# Patient Record
Sex: MALE | Race: WHITE | URBAN - METROPOLITAN AREA
[De-identification: names, ages, dates, MRNs, and addresses within clinical notes are randomized per-mention and may not be internally consistent; named-entity substitution may affect disease eponyms.]

---

## 2017-09-11 ENCOUNTER — TELEPHONE (OUTPATIENT)
Dept: FAMILY MEDICINE CLINIC | Facility: CLINIC | Age: 67
End: 2017-09-11

## 2017-09-11 DIAGNOSIS — N40.1 BENIGN PROSTATIC HYPERPLASIA WITH NOCTURIA: ICD-10-CM

## 2017-09-11 DIAGNOSIS — E11.9 TYPE 2 DIABETES MELLITUS WITHOUT COMPLICATION, WITHOUT LONG-TERM CURRENT USE OF INSULIN (HCC): ICD-10-CM

## 2017-09-11 DIAGNOSIS — R35.1 BENIGN PROSTATIC HYPERPLASIA WITH NOCTURIA: ICD-10-CM

## 2017-09-11 DIAGNOSIS — I10 HTN (HYPERTENSION), BENIGN: Primary | ICD-10-CM

## 2017-09-11 RX ORDER — CYCLOBENZAPRINE HCL 10 MG
1 TABLET ORAL NIGHTLY PRN
COMMUNITY
Start: 2016-07-07 | End: 2017-12-12

## 2017-09-11 RX ORDER — DULOXETIN HYDROCHLORIDE 60 MG/1
2 CAPSULE, DELAYED RELEASE ORAL 2 TIMES DAILY
COMMUNITY
Start: 2015-07-14

## 2017-09-11 RX ORDER — UBIDECARENONE/VITAMIN E MIXED 100 MG-100
1 CAPSULE ORAL DAILY
COMMUNITY
Start: 2015-02-04 | End: 2017-09-18

## 2017-09-11 RX ORDER — GLIMEPIRIDE 2 MG/1
1 TABLET ORAL DAILY
COMMUNITY
Start: 2015-06-19 | End: 2017-09-18

## 2017-09-11 RX ORDER — BRIMONIDINE TARTRATE/TIMOLOL 0.2%-0.5%
1 DROPS OPHTHALMIC (EYE) 2 TIMES DAILY
Refills: 11 | COMMUNITY
Start: 2017-09-06 | End: 2021-05-04

## 2017-09-12 ENCOUNTER — LABORATORY ENCOUNTER (OUTPATIENT)
Dept: LAB | Age: 67
End: 2017-09-12
Attending: FAMILY MEDICINE
Payer: MEDICARE

## 2017-09-12 DIAGNOSIS — N40.1 BENIGN PROSTATIC HYPERPLASIA WITH NOCTURIA: ICD-10-CM

## 2017-09-12 DIAGNOSIS — E11.9 TYPE 2 DIABETES MELLITUS WITHOUT COMPLICATION, WITHOUT LONG-TERM CURRENT USE OF INSULIN (HCC): ICD-10-CM

## 2017-09-12 DIAGNOSIS — R35.1 BENIGN PROSTATIC HYPERPLASIA WITH NOCTURIA: ICD-10-CM

## 2017-09-12 DIAGNOSIS — I10 HTN (HYPERTENSION), BENIGN: ICD-10-CM

## 2017-09-12 LAB
ALBUMIN SERPL-MCNC: 3.7 G/DL (ref 3.5–4.8)
ALP LIVER SERPL-CCNC: 68 U/L (ref 45–117)
ALT SERPL-CCNC: 26 U/L (ref 17–63)
AST SERPL-CCNC: 8 U/L (ref 15–41)
BASOPHILS # BLD AUTO: 0.05 X10(3) UL (ref 0–0.1)
BASOPHILS NFR BLD AUTO: 0.6 %
BILIRUB SERPL-MCNC: 1 MG/DL (ref 0.1–2)
BUN BLD-MCNC: 23 MG/DL (ref 8–20)
CALCIUM BLD-MCNC: 9 MG/DL (ref 8.3–10.3)
CHLORIDE: 111 MMOL/L (ref 101–111)
CHOLEST SMN-MCNC: 202 MG/DL (ref ?–200)
CO2: 24 MMOL/L (ref 22–32)
COMPLEXED PSA SERPL-MCNC: 1.3 NG/ML (ref 0.01–4)
CREAT BLD-MCNC: 1.07 MG/DL (ref 0.7–1.3)
CREAT UR-SCNC: 86.7 MG/DL
EOSINOPHIL # BLD AUTO: 0.32 X10(3) UL (ref 0–0.3)
EOSINOPHIL NFR BLD AUTO: 3.7 %
ERYTHROCYTE [DISTWIDTH] IN BLOOD BY AUTOMATED COUNT: 14 % (ref 11.5–16)
EST. AVERAGE GLUCOSE BLD GHB EST-MCNC: 148 MG/DL (ref 68–126)
GLUCOSE BLD-MCNC: 135 MG/DL (ref 70–99)
HBA1C MFR BLD HPLC: 6.8 % (ref ?–5.7)
HCT VFR BLD AUTO: 43.8 % (ref 37–53)
HDLC SERPL-MCNC: 37 MG/DL (ref 45–?)
HDLC SERPL: 5.46 {RATIO} (ref ?–4.97)
HGB BLD-MCNC: 13.8 G/DL (ref 13–17)
IMMATURE GRANULOCYTE COUNT: 0.03 X10(3) UL (ref 0–1)
IMMATURE GRANULOCYTE RATIO %: 0.3 %
LDLC SERPL CALC-MCNC: 136 MG/DL (ref ?–130)
LDLC SERPL-MCNC: 29 MG/DL (ref 5–40)
LYMPHOCYTES # BLD AUTO: 2.5 X10(3) UL (ref 0.9–4)
LYMPHOCYTES NFR BLD AUTO: 29.1 %
M PROTEIN MFR SERPL ELPH: 7.3 G/DL (ref 6.1–8.3)
MCH RBC QN AUTO: 27.2 PG (ref 27–33.2)
MCHC RBC AUTO-ENTMCNC: 31.5 G/DL (ref 31–37)
MCV RBC AUTO: 86.2 FL (ref 80–99)
MICROALBUMIN UR-MCNC: <0.5 MG/DL
MONOCYTES # BLD AUTO: 0.63 X10(3) UL (ref 0.1–0.6)
MONOCYTES NFR BLD AUTO: 7.3 %
NEUTROPHIL ABS PRELIM: 5.05 X10 (3) UL (ref 1.3–6.7)
NEUTROPHILS # BLD AUTO: 5.05 X10(3) UL (ref 1.3–6.7)
NEUTROPHILS NFR BLD AUTO: 59 %
NONHDLC SERPL-MCNC: 165 MG/DL (ref ?–130)
PLATELET # BLD AUTO: 262 10(3)UL (ref 150–450)
POTASSIUM SERPL-SCNC: 4.4 MMOL/L (ref 3.6–5.1)
RBC # BLD AUTO: 5.08 X10(6)UL (ref 3.8–5.8)
RED CELL DISTRIBUTION WIDTH-SD: 43.7 FL (ref 35.1–46.3)
SODIUM SERPL-SCNC: 143 MMOL/L (ref 136–144)
TRIGLYCERIDES: 143 MG/DL (ref ?–150)
TSI SER-ACNC: 2.76 MIU/ML (ref 0.35–5.5)
URIC ACID: 5.3 MG/DL (ref 2.4–8.7)
WBC # BLD AUTO: 8.6 X10(3) UL (ref 4–13)

## 2017-09-12 PROCEDURE — 80053 COMPREHEN METABOLIC PANEL: CPT

## 2017-09-12 PROCEDURE — 84443 ASSAY THYROID STIM HORMONE: CPT

## 2017-09-12 PROCEDURE — 80061 LIPID PANEL: CPT

## 2017-09-12 PROCEDURE — 82043 UR ALBUMIN QUANTITATIVE: CPT

## 2017-09-12 PROCEDURE — 36415 COLL VENOUS BLD VENIPUNCTURE: CPT

## 2017-09-12 PROCEDURE — 85025 COMPLETE CBC W/AUTO DIFF WBC: CPT

## 2017-09-12 PROCEDURE — 84550 ASSAY OF BLOOD/URIC ACID: CPT

## 2017-09-12 PROCEDURE — 83036 HEMOGLOBIN GLYCOSYLATED A1C: CPT

## 2017-09-12 PROCEDURE — 82570 ASSAY OF URINE CREATININE: CPT

## 2017-09-18 ENCOUNTER — OFFICE VISIT (OUTPATIENT)
Dept: FAMILY MEDICINE CLINIC | Facility: CLINIC | Age: 67
End: 2017-09-18

## 2017-09-18 VITALS
WEIGHT: 224 LBS | HEIGHT: 72 IN | RESPIRATION RATE: 16 BRPM | BODY MASS INDEX: 30.34 KG/M2 | SYSTOLIC BLOOD PRESSURE: 100 MMHG | DIASTOLIC BLOOD PRESSURE: 60 MMHG | TEMPERATURE: 97 F | HEART RATE: 68 BPM

## 2017-09-18 DIAGNOSIS — N02.9 IDIOPATHIC HEMATURIA WITH GLOMERULAR MORPHOLOGIC CHANGES: ICD-10-CM

## 2017-09-18 DIAGNOSIS — E11.9 CONTROLLED TYPE 2 DIABETES MELLITUS WITHOUT COMPLICATION, WITHOUT LONG-TERM CURRENT USE OF INSULIN (HCC): ICD-10-CM

## 2017-09-18 DIAGNOSIS — Z12.11 ENCOUNTER FOR SCREENING COLONOSCOPY: ICD-10-CM

## 2017-09-18 DIAGNOSIS — K85.00 IDIOPATHIC ACUTE PANCREATITIS, UNSPECIFIED COMPLICATION STATUS: ICD-10-CM

## 2017-09-18 DIAGNOSIS — E66.09 CLASS 1 OBESITY DUE TO EXCESS CALORIES WITH SERIOUS COMORBIDITY AND BODY MASS INDEX (BMI) OF 30.0 TO 30.9 IN ADULT: ICD-10-CM

## 2017-09-18 DIAGNOSIS — Z00.00 ENCOUNTER FOR ANNUAL HEALTH EXAMINATION: Primary | ICD-10-CM

## 2017-09-18 PROCEDURE — 99214 OFFICE O/P EST MOD 30 MIN: CPT | Performed by: FAMILY MEDICINE

## 2017-09-18 PROCEDURE — G0439 PPPS, SUBSEQ VISIT: HCPCS | Performed by: FAMILY MEDICINE

## 2017-09-18 RX ORDER — CALCIUM POLYCARBOPHIL 625 MG 625 MG/1
625 TABLET ORAL DAILY
COMMUNITY
End: 2018-08-15

## 2017-09-18 NOTE — PATIENT INSTRUCTIONS
Please schedule colonoscopy. Recommended Websites for Advanced Directives    [email or web address] org/publications/[email or web address]. pdf  An information packet, including necessary form from the [employer] website.      http:/

## 2017-09-18 NOTE — PROGRESS NOTES
[facility] [place]  PROGRESS NOTE  Chief Complaint:   Patient presents with:  Physical      HPI:   This is a 75year old male coming in for his annual Medicare wellness exam.  He has not been seen for 18 months.     He said that he has not been h Triglycerides 143 <150 mg/dL   HDL Cholesterol 37 (L) >45 mg/dL   LDL Cholesterol 136 (H) <130 mg/dL   VLDL 29 5 - 40 mg/dL   Chol/HDL Ratio 5.46 (H) <4.97   Non HDL Chol 165 (H) <130 mg/dL   -MICROALB/CREAT RATIO, RANDOM URINE   Result Value Ref Range pertinent family history. Allergies:    Pravastatin                 Comment:Other reaction(s): Diffuse muscle aches  Current Meds:    Current Outpatient Prescriptions:  Misc Natural Products (SAW PALMETTO COMPLEX EX ST OR) Take 1 capsule by mouth daily.  D splenectomy. PSYCHIATRIC:  Denies depression or anxiety. ENDOCRINOLOGIC:  Denies excessive sweating, cold or heat intolerance, polyuria or polydipsia. He denies any symptoms of high or low blood sugars.   ALLERGIES:  Denies allergic response, history of visualized feet and the appearance is normal.  Bilateral monofilament/sensation of both feet is normal.  Pulsation pedal pulse exam of both lower legs/feet is normal as well. ASSESSMENT AND PLAN:   1.  Encounter for annual health examination  This is h prescriptions requested or ordered in this encounter       Patient/Caregiver Education: Patient/Caregiver Education: There are no barriers to learning. Medical education done. Outcome: Patient verbalizes understanding.  Patient is notified to call with an

## 2017-09-27 ENCOUNTER — TELEPHONE (OUTPATIENT)
Dept: FAMILY MEDICINE CLINIC | Facility: CLINIC | Age: 67
End: 2017-09-27

## 2017-10-03 ENCOUNTER — TELEPHONE (OUTPATIENT)
Dept: FAMILY MEDICINE CLINIC | Facility: CLINIC | Age: 67
End: 2017-10-03

## 2017-10-03 NOTE — TELEPHONE ENCOUNTER
Phone number to Dr Amarjit Sharpe given to [facility]. She will call for Appt.   Hina Raza, 10/03/17, 1:52 PM

## 2017-12-11 ENCOUNTER — TELEPHONE (OUTPATIENT)
Dept: FAMILY MEDICINE CLINIC | Facility: CLINIC | Age: 67
End: 2017-12-11

## 2017-12-11 NOTE — TELEPHONE ENCOUNTER
Patient states it has been 10 years since He has had a new CPap Machine. Requesting Rx for Feebbo to [place]. Appt given with Keaton Rowland 10:45 Tuesday 12/12 for CPap discussion.   Ruth Ann Martinez, 12/11/17, 10:14 AM

## 2017-12-12 ENCOUNTER — OFFICE VISIT (OUTPATIENT)
Dept: FAMILY MEDICINE CLINIC | Facility: CLINIC | Age: 67
End: 2017-12-12

## 2017-12-12 VITALS
DIASTOLIC BLOOD PRESSURE: 62 MMHG | HEART RATE: 60 BPM | WEIGHT: 224 LBS | BODY MASS INDEX: 30.34 KG/M2 | HEIGHT: 72 IN | RESPIRATION RATE: 20 BRPM | TEMPERATURE: 97 F | SYSTOLIC BLOOD PRESSURE: 100 MMHG

## 2017-12-12 DIAGNOSIS — Z99.89 OSA ON CPAP: Primary | ICD-10-CM

## 2017-12-12 DIAGNOSIS — E11.9 TYPE 2 DIABETES MELLITUS WITHOUT COMPLICATION, WITHOUT LONG-TERM CURRENT USE OF INSULIN (HCC): ICD-10-CM

## 2017-12-12 DIAGNOSIS — M62.830 MUSCLE SPASM OF BACK: ICD-10-CM

## 2017-12-12 DIAGNOSIS — I10 HTN (HYPERTENSION), BENIGN: ICD-10-CM

## 2017-12-12 DIAGNOSIS — G47.33 OSA ON CPAP: Primary | ICD-10-CM

## 2017-12-12 PROCEDURE — 99214 OFFICE O/P EST MOD 30 MIN: CPT | Performed by: NURSE PRACTITIONER

## 2017-12-12 RX ORDER — LATANOPROST 50 UG/ML
SOLUTION/ DROPS OPHTHALMIC
Refills: 7 | COMMUNITY
Start: 2017-11-19 | End: 2021-05-04

## 2017-12-12 RX ORDER — TIMOLOL MALEATE 5 MG/ML
SOLUTION/ DROPS OPHTHALMIC
Refills: 7 | COMMUNITY
Start: 2017-11-19 | End: 2021-05-04

## 2017-12-12 RX ORDER — CYCLOBENZAPRINE HCL 10 MG
10 TABLET ORAL NIGHTLY PRN
Qty: 10 TABLET | Refills: 0 | Status: SHIPPED | OUTPATIENT
Start: 2017-12-12 | End: 2017-12-23

## 2017-12-12 NOTE — PROGRESS NOTES
[facility]        HPI:   This is a 75year old male coming in for Patient presents with:  Obstructive Sleep Apnea (RADHA): Sleep f/u/new machine      HPI: Has been using his CPAP since 2010.  States that he uses his mac Solution INT 1 GTT IN OU HS Disp:  Rfl: 7   Timolol Maleate 0.5 % Ophthalmic Solution INT 1 GTT IN OU BID Disp:  Rfl: 7   Cyclobenzaprine HCl 10 MG Oral Tab Take 1 tablet (10 mg total) by mouth nightly as needed.  Disp: 10 tablet Rfl: 0   Misc Natural Produ Wt 224 lb   BMI 30.38 kg/m²  Estimated body mass index is 30.38 kg/m² as calculated from the following:    Height as of this encounter: 72\". Weight as of this encounter: 224 lb.    Neck in inches: Neck Circumferenece: 18.75    Wt Readings from Last 6 appointment in the next week. If not heard from them, please call them at [phone]. If any problems, please call us at [phone].       Warned if still with sleep apnea and not using CPAP has 7 fold increased risk and heart attack, stroke, abnormal

## 2017-12-12 NOTE — PATIENT INSTRUCTIONS
Order for new machine sent to [address] [place] [address] for sleep study will be sent to [facility]. They will call to set up an appointment in the next week. If not heard from them, please call them at [phone].   If any problems, please call us at

## 2017-12-26 RX ORDER — CYCLOBENZAPRINE HCL 10 MG
TABLET ORAL
Qty: 30 TABLET | Refills: 0 | Status: SHIPPED
Start: 2017-12-26 | End: 2018-08-15

## 2017-12-26 NOTE — TELEPHONE ENCOUNTER
Future appt:  None  Last Appointment:  12/12/2017    Cholesterol, Total (mg/dL)   Date Value   09/12/2017 202 (H)   ----------  HDL Cholesterol (mg/dL)   Date Value   09/12/2017 37 (L)   ----------  LDL Cholesterol (mg/dL)   Date Value   09/12/2017 136 (H)

## 2018-01-19 ENCOUNTER — TELEPHONE (OUTPATIENT)
Dept: FAMILY MEDICINE CLINIC | Facility: CLINIC | Age: 68
End: 2018-01-19

## 2018-01-19 DIAGNOSIS — G47.33 OSA (OBSTRUCTIVE SLEEP APNEA): Primary | ICD-10-CM

## 2018-01-19 NOTE — TELEPHONE ENCOUNTER
Please let patient know that his titration study has been read. In order for machine and supplies will be faxed to [facility]. Have him schedule an appointment with Dr. Espinoza Thompson or Gael 2 weeks after getting the machine. Thank you.  Florinda Pedraza, 01/19/18, 1

## 2018-01-22 NOTE — TELEPHONE ENCOUNTER
LM for patient to return call. Per Marimar, patient may already have a machine. May have bought machine outright. If so, will need to make sure patient is on the correct pressure.

## 2018-01-22 NOTE — TELEPHONE ENCOUNTER
Patient informed of the below recommendations. Patient is already on a machine and the pressure is set to 11 which is the appropriate pressure for him. F/u appt scheduled.      Future Appointments  Date Time Provider Rowena Murphy   2/13/2018 11:00 AM

## 2018-02-13 ENCOUNTER — TELEPHONE (OUTPATIENT)
Dept: FAMILY MEDICINE CLINIC | Facility: CLINIC | Age: 68
End: 2018-02-13

## 2018-02-13 NOTE — TELEPHONE ENCOUNTER
I left a message for Abdoul Pierce letting him know that he no showed for his NP sleep appointment today, and to call the office to reschedule. I also noted in the message he will be charged the no show fee.

## 2018-02-15 ENCOUNTER — OFFICE VISIT (OUTPATIENT)
Dept: FAMILY MEDICINE CLINIC | Facility: CLINIC | Age: 68
End: 2018-02-15

## 2018-02-15 VITALS
SYSTOLIC BLOOD PRESSURE: 100 MMHG | RESPIRATION RATE: 20 BRPM | DIASTOLIC BLOOD PRESSURE: 64 MMHG | TEMPERATURE: 97 F | HEART RATE: 72 BPM

## 2018-02-15 DIAGNOSIS — Z99.89 OSA ON CPAP: Primary | ICD-10-CM

## 2018-02-15 DIAGNOSIS — G47.33 OSA ON CPAP: Primary | ICD-10-CM

## 2018-02-15 PROCEDURE — 94660 CPAP INITIATION&MGMT: CPT | Performed by: NURSE PRACTITIONER

## 2018-02-15 NOTE — PATIENT INSTRUCTIONS
Continue using CPAP. Recheck in 6 months - sooner if needed. Warned if still with sleep apnea and not using CPAP has 7 fold increased risk and heart attack, stroke, abnormal heart rhythm, and death. Increased risk of driving accidents.   Advised to r

## 2018-02-15 NOTE — PROGRESS NOTES
[facility]        HPI:   This is a 70year old male coming in for Patient presents with:  Obstructive Sleep Apnea (RADHA): Last visit 12/12/17      HPI: Patient is present to recheck on CPAP.  States that he is doing ok file.  Allergies:    Pravastatin                 Comment:Other reaction(s): Diffuse muscle aches  Current Meds:    Current Outpatient Prescriptions:  CYCLOBENZAPRINE HCL 10 MG Oral Tab TAKE 1 TABLET(10 MG) BY MOUTH EVERY NIGHT AS NEEDED Disp: 30 tablet Rfl EXAM:   /64 (BP Location: Left arm, Patient Position: Sitting, Cuff Size: large)   Pulse 72   Temp (!) 97.1 °F (36.2 °C) (Temporal)   Resp 20  Estimated body mass index is 30.38 kg/m² as calculated from the following:    Height as of 12/12/17: sleep apnea and not using CPAP has a  7 fold increase in risk of heart attack, stroke, abnormal heart rhythm  and death,  increased risk of driving accidents. Advised to refrain from driving when sleepy.     COMPLIANCE is required by insurance for 4 hours

## 2018-05-08 ENCOUNTER — OFFICE VISIT (OUTPATIENT)
Dept: FAMILY MEDICINE CLINIC | Facility: CLINIC | Age: 68
End: 2018-05-08

## 2018-05-08 VITALS
RESPIRATION RATE: 18 BRPM | WEIGHT: 227.13 LBS | SYSTOLIC BLOOD PRESSURE: 106 MMHG | TEMPERATURE: 96 F | BODY MASS INDEX: 30.76 KG/M2 | HEIGHT: 72 IN | OXYGEN SATURATION: 98 % | DIASTOLIC BLOOD PRESSURE: 72 MMHG | HEART RATE: 70 BPM

## 2018-05-08 DIAGNOSIS — J01.00 ACUTE NON-RECURRENT MAXILLARY SINUSITIS: Primary | ICD-10-CM

## 2018-05-08 PROCEDURE — 99213 OFFICE O/P EST LOW 20 MIN: CPT | Performed by: FAMILY MEDICINE

## 2018-05-08 RX ORDER — BENZONATATE 100 MG/1
100 CAPSULE ORAL 3 TIMES DAILY PRN
Qty: 30 CAPSULE | Refills: 1 | Status: SHIPPED | OUTPATIENT
Start: 2018-05-08 | End: 2018-08-15

## 2018-05-08 RX ORDER — AZITHROMYCIN 250 MG/1
TABLET, FILM COATED ORAL
Qty: 6 TABLET | Refills: 0 | Status: SHIPPED | OUTPATIENT
Start: 2018-05-08 | End: 2018-08-15

## 2018-05-08 NOTE — PROGRESS NOTES
[facility] [place]  PROGRESS NOTE  Chief Complaint:   Patient presents with:  Cough      HPI:   This is a 70year old male coming in for cough, sinus congestion, stuffiness, and just not feeling well.   He reports that his wife was recently diagn 150.0 - 450.0 10(3)uL   MCV 86.2 80.0 - 99.0 fL   MCH 27.2 27.0 - 33.2 pg   MCHC 31.5 31.0 - 37.0 g/dL   RDW 14.0 11.5 - 16.0 %   RDW-SD 43.7 35.1 - 46.3 fL   Neutrophil Absolute Prelim 5.05 1.30 - 6.70 x10 (3) uL   Neutrophil Absolute 5.05 1.30 - 6.70 x10 Polycarbophil (FIBERCON) 625 MG Oral Tab Take 625 mg by mouth daily. Disp:  Rfl:    topiramate (TOPAMAX) 200 MG Oral Tab Take 1 tablet by mouth daily. Disp:  Rfl:    COMBIGAN 0.2-0.5 % Ophthalmic Solution 1 drop by Each eye route 2 (two) times daily.  Disp: 72\".    Weight as of this encounter: 227 lb 2 oz. Vital signs reviewed. Appears stated age, well groomed. Physical Exam:  GEN: He is alert and smiling. He does not appear ill. He is coughing frequently. He is very congested.   HEENT:  Head:  Normoceph shoulder     Backache     Low back pain     Allergic contact dermatitis due to other agents     Controlled type 2 diabetes mellitus without complication, without long-term current use of insulin (HCC)     Disorder of lipid metabolism     Psychosexual dysfu

## 2018-08-15 ENCOUNTER — OFFICE VISIT (OUTPATIENT)
Dept: FAMILY MEDICINE CLINIC | Facility: CLINIC | Age: 68
End: 2018-08-15
Payer: MEDICARE

## 2018-08-15 VITALS
RESPIRATION RATE: 16 BRPM | BODY MASS INDEX: 31 KG/M2 | TEMPERATURE: 97 F | WEIGHT: 228 LBS | DIASTOLIC BLOOD PRESSURE: 80 MMHG | HEART RATE: 68 BPM | SYSTOLIC BLOOD PRESSURE: 110 MMHG

## 2018-08-15 DIAGNOSIS — G47.33 OSA ON CPAP: Primary | ICD-10-CM

## 2018-08-15 DIAGNOSIS — Z99.89 OSA ON CPAP: Primary | ICD-10-CM

## 2018-08-15 PROCEDURE — 99213 OFFICE O/P EST LOW 20 MIN: CPT | Performed by: NURSE PRACTITIONER

## 2018-08-15 NOTE — PATIENT INSTRUCTIONS
Continue using CPAP. Try new mask. Recheck in 6 months - sooner if needed. Schedule physical with Dr. Thomas Jauregui after 9/18/18.      Warned if still with sleep apnea and not using CPAP has 7 fold increased risk and heart attack, stroke, abnormal heart r

## 2018-08-15 NOTE — PROGRESS NOTES
[facility]        HPI:   This is a 70year old male coming in for Patient presents with:  Obstructive Sleep Apnea (RADHA): Sleep compliance f/u       HPI:     Present for recheck on CPAP compliance.  Doing well with [facility] Alcohol use: No              Family History:  No family history on file.   Allergies:    Pravastatin                 Comment:Other reaction(s): Diffuse muscle aches  Current Meds:    Current Outpatient Prescriptions:  latanoprost 0.005 % Ophthalmic Soluti (35.9 °C) (Tympanic)   Resp 16   Wt 228 lb   BMI 30.92 kg/m²  Estimated body mass index is 30.92 kg/m² as calculated from the following:    Height as of 5/8/18: 72\". Weight as of this encounter: 228 lb. Neck in inches:       Wt Readings from Last 6 En still with sleep apnea and not using CPAP has a  7 fold increase in risk of heart attack, stroke, abnormal heart rhythm  and death,  increased risk of driving accidents. Advised to refrain from driving when sleepy.     COMPLIANCE is required by insurance

## 2018-09-19 ENCOUNTER — TELEPHONE (OUTPATIENT)
Dept: FAMILY MEDICINE CLINIC | Facility: CLINIC | Age: 68
End: 2018-09-19

## 2018-09-19 DIAGNOSIS — E78.9 DISORDER OF LIPID METABOLISM: ICD-10-CM

## 2018-09-19 DIAGNOSIS — E11.9 CONTROLLED TYPE 2 DIABETES MELLITUS WITHOUT COMPLICATION, WITHOUT LONG-TERM CURRENT USE OF INSULIN (HCC): ICD-10-CM

## 2018-09-19 DIAGNOSIS — I10 HTN (HYPERTENSION), BENIGN: Primary | ICD-10-CM

## 2018-09-19 DIAGNOSIS — Z12.5 ENCOUNTER FOR SCREENING FOR MALIGNANT NEOPLASM OF PROSTATE: ICD-10-CM

## 2018-09-19 NOTE — TELEPHONE ENCOUNTER
Patient requesting Fasting Lab Orders to include PSA Level. Please advise.   [facility], 09/19/18, 11:03 AM

## 2018-09-19 NOTE — TELEPHONE ENCOUNTER
Patient has appointment for medicare annual on 9/25, wants to know if he can have some blood work done before his px appointment so he can discuss results with , needs orders.

## 2018-09-21 ENCOUNTER — LABORATORY ENCOUNTER (OUTPATIENT)
Dept: LAB | Age: 68
End: 2018-09-21
Attending: FAMILY MEDICINE
Payer: MEDICARE

## 2018-09-21 DIAGNOSIS — Z12.5 ENCOUNTER FOR SCREENING FOR MALIGNANT NEOPLASM OF PROSTATE: ICD-10-CM

## 2018-09-21 DIAGNOSIS — E11.9 CONTROLLED TYPE 2 DIABETES MELLITUS WITHOUT COMPLICATION, WITHOUT LONG-TERM CURRENT USE OF INSULIN (HCC): ICD-10-CM

## 2018-09-21 DIAGNOSIS — E78.9 DISORDER OF LIPID METABOLISM: ICD-10-CM

## 2018-09-21 DIAGNOSIS — I10 HTN (HYPERTENSION), BENIGN: ICD-10-CM

## 2018-09-21 LAB
ALBUMIN SERPL-MCNC: 3.8 G/DL (ref 3.5–4.8)
ALBUMIN/GLOB SERPL: 1.1 {RATIO} (ref 1–2)
ALP LIVER SERPL-CCNC: 71 U/L (ref 45–117)
ALT SERPL-CCNC: 31 U/L (ref 17–63)
ANION GAP SERPL CALC-SCNC: 7 MMOL/L (ref 0–18)
AST SERPL-CCNC: 13 U/L (ref 15–41)
BASOPHILS # BLD AUTO: 0.04 X10(3) UL (ref 0–0.1)
BASOPHILS NFR BLD AUTO: 0.5 %
BILIRUB SERPL-MCNC: 0.4 MG/DL (ref 0.1–2)
BUN BLD-MCNC: 29 MG/DL (ref 8–20)
BUN/CREAT SERPL: 26.1 (ref 10–20)
CALCIUM BLD-MCNC: 9.1 MG/DL (ref 8.3–10.3)
CHLORIDE SERPL-SCNC: 112 MMOL/L (ref 101–111)
CHOLEST SMN-MCNC: 200 MG/DL (ref ?–200)
CO2 SERPL-SCNC: 22 MMOL/L (ref 22–32)
COMPLEXED PSA SERPL-MCNC: 1.45 NG/ML (ref 0.01–4)
CREAT BLD-MCNC: 1.11 MG/DL (ref 0.7–1.3)
CREAT UR-SCNC: 79.6 MG/DL
EOSINOPHIL # BLD AUTO: 0.31 X10(3) UL (ref 0–0.3)
EOSINOPHIL NFR BLD AUTO: 3.8 %
ERYTHROCYTE [DISTWIDTH] IN BLOOD BY AUTOMATED COUNT: 14 % (ref 11.5–16)
EST. AVERAGE GLUCOSE BLD GHB EST-MCNC: 146 MG/DL (ref 68–126)
GLOBULIN PLAS-MCNC: 3.5 G/DL (ref 2.5–4)
GLUCOSE BLD-MCNC: 139 MG/DL (ref 70–99)
HBA1C MFR BLD HPLC: 6.7 % (ref ?–5.7)
HCT VFR BLD AUTO: 44.7 % (ref 37–53)
HDLC SERPL-MCNC: 29 MG/DL (ref 40–59)
HGB BLD-MCNC: 13.8 G/DL (ref 13–17)
IMMATURE GRANULOCYTE COUNT: 0.02 X10(3) UL (ref 0–1)
IMMATURE GRANULOCYTE RATIO %: 0.2 %
LDLC SERPL CALC-MCNC: 140 MG/DL (ref ?–100)
LYMPHOCYTES # BLD AUTO: 2.4 X10(3) UL (ref 0.9–4)
LYMPHOCYTES NFR BLD AUTO: 29.7 %
M PROTEIN MFR SERPL ELPH: 7.3 G/DL (ref 6.1–8.3)
MCH RBC QN AUTO: 27.2 PG (ref 27–33.2)
MCHC RBC AUTO-ENTMCNC: 30.9 G/DL (ref 31–37)
MCV RBC AUTO: 88.2 FL (ref 80–99)
MICROALBUMIN UR-MCNC: <0.5 MG/DL
MONOCYTES # BLD AUTO: 0.58 X10(3) UL (ref 0.1–1)
MONOCYTES NFR BLD AUTO: 7.2 %
NEUTROPHIL ABS PRELIM: 4.72 X10 (3) UL (ref 1.3–6.7)
NEUTROPHILS # BLD AUTO: 4.72 X10(3) UL (ref 1.3–6.7)
NEUTROPHILS NFR BLD AUTO: 58.6 %
NONHDLC SERPL-MCNC: 171 MG/DL (ref ?–130)
OSMOLALITY SERPL CALC.SUM OF ELEC: 300 MOSM/KG (ref 275–295)
PLATELET # BLD AUTO: 267 10(3)UL (ref 150–450)
POTASSIUM SERPL-SCNC: 4.5 MMOL/L (ref 3.6–5.1)
RBC # BLD AUTO: 5.07 X10(6)UL (ref 3.8–5.8)
RED CELL DISTRIBUTION WIDTH-SD: 45.2 FL (ref 35.1–46.3)
SODIUM SERPL-SCNC: 141 MMOL/L (ref 136–144)
TRIGL SERPL-MCNC: 156 MG/DL (ref 30–149)
TSI SER-ACNC: 3.07 MIU/ML (ref 0.35–5.5)
URATE SERPL-MCNC: 5.8 MG/DL (ref 2.4–8.7)
VLDLC SERPL CALC-MCNC: 31 MG/DL (ref 0–30)
WBC # BLD AUTO: 8.1 X10(3) UL (ref 4–13)

## 2018-09-21 PROCEDURE — 85025 COMPLETE CBC W/AUTO DIFF WBC: CPT

## 2018-09-21 PROCEDURE — 82570 ASSAY OF URINE CREATININE: CPT

## 2018-09-21 PROCEDURE — 82043 UR ALBUMIN QUANTITATIVE: CPT

## 2018-09-21 PROCEDURE — 80061 LIPID PANEL: CPT

## 2018-09-21 PROCEDURE — 84443 ASSAY THYROID STIM HORMONE: CPT

## 2018-09-21 PROCEDURE — 36415 COLL VENOUS BLD VENIPUNCTURE: CPT

## 2018-09-21 PROCEDURE — 83036 HEMOGLOBIN GLYCOSYLATED A1C: CPT

## 2018-09-21 PROCEDURE — 84550 ASSAY OF BLOOD/URIC ACID: CPT

## 2018-09-21 PROCEDURE — 80053 COMPREHEN METABOLIC PANEL: CPT

## 2018-09-25 ENCOUNTER — OFFICE VISIT (OUTPATIENT)
Dept: FAMILY MEDICINE CLINIC | Facility: CLINIC | Age: 68
End: 2018-09-25
Payer: MEDICARE

## 2018-09-25 VITALS
WEIGHT: 218.38 LBS | RESPIRATION RATE: 16 BRPM | TEMPERATURE: 97 F | SYSTOLIC BLOOD PRESSURE: 104 MMHG | HEART RATE: 80 BPM | BODY MASS INDEX: 29.58 KG/M2 | DIASTOLIC BLOOD PRESSURE: 70 MMHG | HEIGHT: 72 IN

## 2018-09-25 DIAGNOSIS — E11.9 CONTROLLED TYPE 2 DIABETES MELLITUS WITHOUT COMPLICATION, WITHOUT LONG-TERM CURRENT USE OF INSULIN (HCC): ICD-10-CM

## 2018-09-25 DIAGNOSIS — Z00.00 ENCOUNTER FOR ANNUAL HEALTH EXAMINATION: ICD-10-CM

## 2018-09-25 DIAGNOSIS — M54.50 CHRONIC MIDLINE LOW BACK PAIN WITHOUT SCIATICA: Primary | ICD-10-CM

## 2018-09-25 DIAGNOSIS — M79.2 NEURALGIA: ICD-10-CM

## 2018-09-25 DIAGNOSIS — I10 HTN (HYPERTENSION), BENIGN: ICD-10-CM

## 2018-09-25 DIAGNOSIS — G89.29 CHRONIC MIDLINE LOW BACK PAIN WITHOUT SCIATICA: Primary | ICD-10-CM

## 2018-09-25 DIAGNOSIS — E78.9 DISORDER OF LIPID METABOLISM: ICD-10-CM

## 2018-09-25 PROCEDURE — G0439 PPPS, SUBSEQ VISIT: HCPCS | Performed by: FAMILY MEDICINE

## 2018-09-25 PROCEDURE — 99214 OFFICE O/P EST MOD 30 MIN: CPT | Performed by: FAMILY MEDICINE

## 2018-09-25 NOTE — PATIENT INSTRUCTIONS
PT referral for back pain. Recommended Websites for Advanced Directives    [email or web address] org/publications/Documents/personal_[email or web address]. pdf  An information packet, including necessary form from the [employer] website. http://www. idph.

## 2018-09-25 NOTE — PROGRESS NOTES
[facility] [place]  PROGRESS NOTE  Chief Complaint:   Patient presents with:  Physical: Interested in Cologuard      HPI:   This is a 70year old male coming in for his annual Medicare wellness exam.    He has chronic low back pain.   Sometimes h 3.070 0.350 - 5.500 mIU/mL   URIC ACID, SERUM   Result Value Ref Range    Uric Acid 5.8 2.4 - 8.7 mg/dL   PSA SCREEN   Result Value Ref Range    Prostate Specific Antigen Screen 1.45 0.01 - 4.00 ng/mL   HEMOGLOBIN A1C   Result Value Ref Range    HgbA1C 6.7 Outpatient Medications:  latanoprost 0.005 % Ophthalmic Solution INT 1 GTT IN OU HS Disp:  Rfl: 7   Timolol Maleate 0.5 % Ophthalmic Solution INT 1 GTT IN OU BID Disp:  Rfl: 7   Misc Natural Products (SAW PALMETTO COMPLEX EX ST OR) Take 1 capsule by mouth (Tympanic)   Resp 16   Ht 72\"   Wt 218 lb 6.4 oz   BMI 29.62 kg/m²  Estimated body mass index is 29.62 kg/m² as calculated from the following:    Height as of this encounter: 72\". Weight as of this encounter: 218 lb 6.4 oz. Vital signs reviewed.   [facility] spine center.  - PHYSICAL THERAPY EXTERNAL  - OFFICE/OUTPT VISIT,EST,LEVL IV    2. HTN (hypertension), benign  His blood pressure is well controlled now. - OFFICE/OUTPT VISIT,EST,LEVL IV    3.  Controlled type 2 diabetes mellitus without complication, with dysfunction with inhibited sexual excitement     Routine general medical examination at a health care facility     Hydronephrosis     Calculus of kidney     Obesity     Pyelonephritis     Right bundle branch block     RADHA on CPAP     Calculus of ureter

## 2018-09-28 ENCOUNTER — MED REC SCAN ONLY (OUTPATIENT)
Dept: FAMILY MEDICINE CLINIC | Facility: CLINIC | Age: 68
End: 2018-09-28

## 2018-11-26 ENCOUNTER — TELEPHONE (OUTPATIENT)
Dept: FAMILY MEDICINE CLINIC | Facility: CLINIC | Age: 68
End: 2018-11-26

## 2018-11-26 NOTE — TELEPHONE ENCOUNTER
Please call Demian Gravely. This is great news. His Cologard testing is negative. No additional testing needed now.

## 2019-01-07 ENCOUNTER — TELEPHONE (OUTPATIENT)
Dept: FAMILY MEDICINE CLINIC | Facility: CLINIC | Age: 69
End: 2019-01-07

## 2019-01-07 NOTE — TELEPHONE ENCOUNTER
Information requested emailed to myself and emailed to patient at [email or web address] [employer]. Sent secure by myself/sherman Henriquez, 01/07/19, 2:00 PM

## 2019-01-07 NOTE — TELEPHONE ENCOUNTER
Needs copy of last blood work and needs cologuard results fax today (right now if possible) to [phone].

## 2019-04-27 ENCOUNTER — TELEPHONE (OUTPATIENT)
Dept: FAMILY MEDICINE CLINIC | Facility: CLINIC | Age: 69
End: 2019-04-27

## 2019-05-11 ENCOUNTER — TELEPHONE (OUTPATIENT)
Dept: FAMILY MEDICINE CLINIC | Facility: CLINIC | Age: 69
End: 2019-05-11

## 2019-05-11 NOTE — TELEPHONE ENCOUNTER
Received CME for supplies. Pt needs follow up appointment for compliance, last OV 8/15/18. Spoke with patient and he scheduled appointment and was instructed to bring in the card from his machine. Pt verbalized understanding.   Future Appointments   Date Ti

## 2019-05-16 ENCOUNTER — OFFICE VISIT (OUTPATIENT)
Dept: FAMILY MEDICINE CLINIC | Facility: CLINIC | Age: 69
End: 2019-05-16
Payer: MEDICARE

## 2019-05-16 VITALS
RESPIRATION RATE: 18 BRPM | DIASTOLIC BLOOD PRESSURE: 68 MMHG | BODY MASS INDEX: 30.53 KG/M2 | TEMPERATURE: 97 F | HEIGHT: 72 IN | HEART RATE: 76 BPM | WEIGHT: 225.38 LBS | SYSTOLIC BLOOD PRESSURE: 110 MMHG

## 2019-05-16 DIAGNOSIS — Z99.89 OSA ON CPAP: Primary | ICD-10-CM

## 2019-05-16 DIAGNOSIS — G47.33 OSA ON CPAP: Primary | ICD-10-CM

## 2019-05-16 PROCEDURE — 99213 OFFICE O/P EST LOW 20 MIN: CPT | Performed by: NURSE PRACTITIONER

## 2019-05-16 NOTE — PROGRESS NOTES
[facility]        HPI:   This is a 78year old male coming in for Patient presents with:  Obstructive Sleep Apnea (RADHA): f/u      HPI:     Patient is present to review his sleep therapy. Needs CMN signed for Ana María Comment:Other reaction(s): Diffuse muscle aches  Current Meds:    Current Outpatient Medications:  latanoprost 0.005 % Ophthalmic Solution INT 1 GTT IN OU HS Disp:  Rfl: 7   Timolol Maleate 0.5 % Ophthalmic Solution INT 1 GTT IN OU BID Disp:  Rfl: 7 30.57 kg/m²  Estimated body mass index is 30.57 kg/m² as calculated from the following:    Height as of this encounter: 72\". Weight as of this encounter: 225 lb 6.4 oz. Neck in inches:       Wt Readings from Last 6 Encounters:  05/16/19 : 225 lb 6.4 o nights of the week. Recommend weight loss, and maintain and optimal BMI with Exercise 30 minutes most days of the week to target heart rate . Advised patient to change filters,masks,hoses  and tubes and equiptment on a  regular schedule.   Filters and

## 2019-05-16 NOTE — PATIENT INSTRUCTIONS
Get new mask for leaks. Continue sleep therapy. Follow-up in 6 months - sooner if needed.      Advised if still with sleep apnea and not using CPAP has a  7 fold increase in risk of heart attack, stroke, abnormal heart rhythm  and death,  increased risk

## 2019-11-22 ENCOUNTER — PATIENT OUTREACH (OUTPATIENT)
Dept: CASE MANAGEMENT | Age: 69
End: 2019-11-22

## 2019-12-02 ENCOUNTER — TELEPHONE (OUTPATIENT)
Dept: FAMILY MEDICINE CLINIC | Facility: CLINIC | Age: 69
End: 2019-12-02

## 2019-12-02 NOTE — TELEPHONE ENCOUNTER
Patient states he has cloudy urine. Requesting evaluation today or tomorrow. Appt given with Bill tomorrow 1pm  For evaluation for UTI. Luciano Flaherty, 12/02/19, 2:27 PM    Future appt:     Your appointments     Date & Time Appointment Department [phone]

## 2019-12-03 ENCOUNTER — OFFICE VISIT (OUTPATIENT)
Dept: FAMILY MEDICINE CLINIC | Facility: CLINIC | Age: 69
End: 2019-12-03
Payer: MEDICARE

## 2019-12-03 VITALS
SYSTOLIC BLOOD PRESSURE: 118 MMHG | OXYGEN SATURATION: 97 % | WEIGHT: 230 LBS | DIASTOLIC BLOOD PRESSURE: 80 MMHG | BODY MASS INDEX: 31.15 KG/M2 | TEMPERATURE: 98 F | HEIGHT: 72 IN | HEART RATE: 62 BPM

## 2019-12-03 DIAGNOSIS — R39.15 URGENCY OF URINATION: ICD-10-CM

## 2019-12-03 DIAGNOSIS — N30.90 CYSTITIS: Primary | ICD-10-CM

## 2019-12-03 PROCEDURE — 87077 CULTURE AEROBIC IDENTIFY: CPT | Performed by: NURSE PRACTITIONER

## 2019-12-03 PROCEDURE — 87086 URINE CULTURE/COLONY COUNT: CPT | Performed by: NURSE PRACTITIONER

## 2019-12-03 PROCEDURE — 81001 URINALYSIS AUTO W/SCOPE: CPT | Performed by: NURSE PRACTITIONER

## 2019-12-03 PROCEDURE — 81003 URINALYSIS AUTO W/O SCOPE: CPT | Performed by: NURSE PRACTITIONER

## 2019-12-03 PROCEDURE — 99214 OFFICE O/P EST MOD 30 MIN: CPT | Performed by: NURSE PRACTITIONER

## 2019-12-03 PROCEDURE — 87186 SC STD MICRODIL/AGAR DIL: CPT | Performed by: NURSE PRACTITIONER

## 2019-12-03 RX ORDER — SULFAMETHOXAZOLE AND TRIMETHOPRIM 800; 160 MG/1; MG/1
1 TABLET ORAL 2 TIMES DAILY
Qty: 20 TABLET | Refills: 0 | Status: SHIPPED | OUTPATIENT
Start: 2019-12-03 | End: 2019-12-13

## 2019-12-03 NOTE — PATIENT INSTRUCTIONS
Urine specimen taken today, urine analysis and reflex order placed today as well. Patient will be informed of results. Patient is to begin antibiotic, advised to continue pushing plenty of fluids.     Continue to monitor symptoms such as flank pain, fev professional if they continue or are bothersome):  · diarrhea  · dizziness  · headache  · loss of appetite  · nausea, vomiting  · nervousness  What may interact with this medicine?   Do not take this medicine with any of the following medications:  · aminob products. Contact your doctor if you have diarrhea that lasts more than 2 days or if it is severe and watery. This medicine can make you more sensitive to the sun. Keep out of the sun.  If you cannot avoid being in the sun, wear protective clothing and use

## 2019-12-03 NOTE — PROGRESS NOTES
HPI:    Patient ID: Charline Ricketts is a 78year old male. Patient presents to the clinic today with complaints of cloudy urine, for about a week. Reports that he is had not been drinking enough water, was having some frequency.  Started drinking more water, Ceruminous right ear   Eyes: Pupils are equal, round, and reactive to light. Neck: Normal range of motion. Cardiovascular: Normal rate, regular rhythm and normal heart sounds.    Pulmonary/Chest: Effort normal and breath sounds normal. No respiratory

## 2019-12-05 ENCOUNTER — TELEPHONE (OUTPATIENT)
Dept: FAMILY MEDICINE CLINIC | Facility: CLINIC | Age: 69
End: 2019-12-05

## 2019-12-07 ENCOUNTER — TELEPHONE (OUTPATIENT)
Dept: FAMILY MEDICINE CLINIC | Facility: CLINIC | Age: 69
End: 2019-12-07

## 2019-12-07 NOTE — TELEPHONE ENCOUNTER
Patient notified of lab result and Ambar's instructions. Patient states he was given 10 day course of antibiotic and has not finished it yet. Will call if not improved by end of treatment.

## 2019-12-07 NOTE — TELEPHONE ENCOUNTER
----- Message from YAHIR Betancourt sent at 12/7/2019 10:25 AM CST -----  Results reviewed. Culture consistent with infection. Has he taken the entire course of antibiotic? Is he still symptomatic? If so we can extend antibiotic treatment.  If not n

## 2019-12-23 ENCOUNTER — PATIENT OUTREACH (OUTPATIENT)
Dept: CASE MANAGEMENT | Age: 69
End: 2019-12-23

## 2020-01-22 ENCOUNTER — PATIENT OUTREACH (OUTPATIENT)
Dept: CASE MANAGEMENT | Age: 70
End: 2020-01-22

## 2020-01-24 ENCOUNTER — PATIENT OUTREACH (OUTPATIENT)
Dept: FAMILY MEDICINE CLINIC | Facility: CLINIC | Age: 70
End: 2020-01-24

## 2020-03-06 ENCOUNTER — TELEPHONE (OUTPATIENT)
Dept: FAMILY MEDICINE CLINIC | Facility: CLINIC | Age: 70
End: 2020-03-06

## 2020-03-18 ENCOUNTER — TELEPHONE (OUTPATIENT)
Dept: FAMILY MEDICINE CLINIC | Facility: CLINIC | Age: 70
End: 2020-03-18

## 2020-03-18 DIAGNOSIS — B34.9 VIRAL ILLNESS: Primary | ICD-10-CM

## 2020-03-18 NOTE — TELEPHONE ENCOUNTER
I agree with testing. The testing protocol is going to be set up for our clinic in [place]. It is not yet available. It should be ready later on today or tomorrow morning.   I will put an order in the chart for the test.  We will get back to you and

## 2020-03-18 NOTE — TELEPHONE ENCOUNTER
Mimi informed of below. Expressed understanding. States at this time patient will refuse to go out of town for testing. May go to [place] with an order when testing available.   Ana Covington, 03/18/20, 1:10 PM

## 2020-03-18 NOTE — TELEPHONE ENCOUNTER
Javier Le-  Patient came home Monday at noon with a headache/fatigue. Slept all afternoon/night. Yesterday morning went to work. Came home 3pm with:  Chills/Headache/Exhaustion.     No: LMAOE Aches/Fever/Sore Throat/Cough    [employer] Virus Test

## 2020-03-19 ENCOUNTER — TELEPHONE (OUTPATIENT)
Dept: FAMILY MEDICINE CLINIC | Facility: CLINIC | Age: 70
End: 2020-03-19

## 2020-03-19 NOTE — TELEPHONE ENCOUNTER
FYI: Pt was called to schedule CoVid 19 testing but he seemed to be confused as to why he needed it- was not aware that there was a reason for this and had not spoken to Dr about requesting an order so no testing appt was made

## 2020-03-19 NOTE — TELEPHONE ENCOUNTER
Spoke with patient's wife Mimi. States she has not told patient about having the testing done so this is why he informed Norah Herrera that he knew nothing about it.   Wife states she has already spoken with [facility] about testing and is awaiting her c/b once

## 2020-03-20 NOTE — TELEPHONE ENCOUNTER
No.  We will cancel the test order. Please continue to stay at home until the symptoms have completely resolved.

## 2020-03-20 NOTE — TELEPHONE ENCOUNTER
Mimi states patient is feeling better. Still very fatigued. No: Cough/Sore Throat/Headache/Fever/  Body Aches/Chills. Questions if they should still procede with  Corona Virus Testing once available?   Katina Flores, 03/20/20, 10:27 AM

## 2020-04-28 ENCOUNTER — VIRTUAL PHONE E/M (OUTPATIENT)
Dept: FAMILY MEDICINE CLINIC | Facility: CLINIC | Age: 70
End: 2020-04-28
Payer: MEDICARE

## 2020-04-28 VITALS — WEIGHT: 230 LBS | BODY MASS INDEX: 31 KG/M2

## 2020-04-28 DIAGNOSIS — Z99.89 OSA ON CPAP: Primary | ICD-10-CM

## 2020-04-28 DIAGNOSIS — G47.33 OSA ON CPAP: Primary | ICD-10-CM

## 2020-04-28 PROCEDURE — 99442 PHONE E/M BY PHYS 11-20 MIN: CPT | Performed by: NURSE PRACTITIONER

## 2020-04-28 NOTE — PROGRESS NOTES
[facility]  SLEEP PROGRESS NOTE      Fede Falcon  verbally consents to a Virtual/Telephone Check-In service on 4/28/2020.     Patient understands and accepts financial responsibility for any deductible, co-insurance and/or co-pays as Social History:  Social History    Patient does not qualify to have social determinant information on file (likely too young).     Social History Narrative      Not on file    Social History    Tobacco Use      Smoking status: Never Smoker      [place] HENT: Negative. Eyes: Negative. Respiratory: Negative. Cardiovascular: Negative. Musculoskeletal: Negative. Skin: Negative. Neurological: Negative. Psychiatric/Behavioral: Negative.         EXAM:   Wt 230 lb (104.3 kg)   BMI 31.19 kg/ provider. Advised if still with sleep apnea and not using CPAP has a  7 fold increase in risk of heart attack, stroke, abnormal heart rhythm  and death,  increased risk of driving accidents. Advised to refrain from driving when sleepy.     Leonela Haile

## 2020-11-24 ENCOUNTER — TELEPHONE (OUTPATIENT)
Dept: FAMILY MEDICINE CLINIC | Facility: CLINIC | Age: 70
End: 2020-11-24

## 2020-11-24 NOTE — TELEPHONE ENCOUNTER
Patient tested for Frann Fruits on Friday at [facility]. Results not yet back. Patient with increasing Covid19 Sx. Today still in bed/fatigue/cough/shortness of breath. O2% 90. Advised [facility] ER/agrees.     Mellissa Montoya, 11/24/20, 8:42 AM

## 2020-12-30 ENCOUNTER — TELEPHONE (OUTPATIENT)
Dept: FAMILY MEDICINE CLINIC | Facility: CLINIC | Age: 70
End: 2020-12-30

## 2020-12-30 NOTE — TELEPHONE ENCOUNTER
Dilshad Falcon  verbally consents to a Virtual/Telephone Check-In service on 12/312020. Patient understands and accepts financial responsibility for any deductible, co-insurance and/or co-pays associated with this service.       consent given for PV to

## 2020-12-31 ENCOUNTER — VIRTUAL PHONE E/M (OUTPATIENT)
Dept: FAMILY MEDICINE CLINIC | Facility: CLINIC | Age: 70
End: 2020-12-31
Payer: MEDICARE

## 2020-12-31 DIAGNOSIS — Z86.16 HISTORY OF 2019 NOVEL CORONAVIRUS DISEASE (COVID-19): ICD-10-CM

## 2020-12-31 DIAGNOSIS — G47.33 OSA ON CPAP: Primary | ICD-10-CM

## 2020-12-31 DIAGNOSIS — Z99.89 OSA ON CPAP: Primary | ICD-10-CM

## 2020-12-31 PROCEDURE — 99441 PHONE E/M BY PHYS 5-10 MIN: CPT | Performed by: NURSE PRACTITIONER

## 2020-12-31 NOTE — PROGRESS NOTES
[facility]        HPI:   This is a 69year old male coming in for Patient presents with: Follow - Up: Sleep follow up      HPI:     Phone visit done with patient to review his sleep therapy.   Reviewed with patient t Drug use: No    Family History:  History reviewed. No pertinent family history.   Allergies:    Pravastatin                 Comment:Other reaction(s): Diffuse muscle aches  Current Meds:  Current Outpatient Medications   Medication Sig Dispense Refill   • l index is 31.19 kg/m² as calculated from the following:    Height as of 12/3/19: 6' (1.829 m). Weight as of 4/28/20: 230 lb (104.3 kg). Neck in inches:       Wt Readings from Last 6 Encounters:  04/28/20 : 230 lb (104.3 kg)  12/03/19 : 230 lb (104.3 kg) Advised to refrain from driving when sleepy. COMPLIANCE is required by insurance for 4 hours a night most nights of the week. Recommend weight loss, and maintain and optimal BMI with Exercise 30 minutes most days of the week to target heart rate .

## 2021-02-01 ENCOUNTER — TELEPHONE (OUTPATIENT)
Dept: FAMILY MEDICINE CLINIC | Facility: CLINIC | Age: 71
End: 2021-02-01

## 2021-02-01 NOTE — TELEPHONE ENCOUNTER
I am able to view his ECG that was done at [facility] on November 27, 2020. As long as he does not have any heart related symptoms he does not require an additional ECG now.

## 2021-02-01 NOTE — TELEPHONE ENCOUNTER
Patient coming in for PreOp for Cataract tomorrow. Are you able to pull up the EKG from 11/20  Hospitalization? Please advise.   Marysol Henriquez, 02/01/21, 1:44 PM

## 2021-02-02 ENCOUNTER — OFFICE VISIT (OUTPATIENT)
Dept: FAMILY MEDICINE CLINIC | Facility: CLINIC | Age: 71
End: 2021-02-02
Payer: MEDICARE

## 2021-02-02 VITALS
OXYGEN SATURATION: 98 % | TEMPERATURE: 97 F | WEIGHT: 225.81 LBS | HEART RATE: 81 BPM | HEIGHT: 71 IN | RESPIRATION RATE: 16 BRPM | BODY MASS INDEX: 31.61 KG/M2 | DIASTOLIC BLOOD PRESSURE: 68 MMHG | SYSTOLIC BLOOD PRESSURE: 100 MMHG

## 2021-02-02 DIAGNOSIS — G89.29 CHRONIC MIDLINE LOW BACK PAIN WITHOUT SCIATICA: ICD-10-CM

## 2021-02-02 DIAGNOSIS — G47.33 OSA ON CPAP: ICD-10-CM

## 2021-02-02 DIAGNOSIS — Z86.16 HISTORY OF COVID-19: ICD-10-CM

## 2021-02-02 DIAGNOSIS — I10 HTN (HYPERTENSION), BENIGN: ICD-10-CM

## 2021-02-02 DIAGNOSIS — Z99.89 OSA ON CPAP: ICD-10-CM

## 2021-02-02 DIAGNOSIS — E11.9 CONTROLLED TYPE 2 DIABETES MELLITUS WITHOUT COMPLICATION, WITHOUT LONG-TERM CURRENT USE OF INSULIN (HCC): ICD-10-CM

## 2021-02-02 DIAGNOSIS — M54.50 CHRONIC MIDLINE LOW BACK PAIN WITHOUT SCIATICA: ICD-10-CM

## 2021-02-02 DIAGNOSIS — Z01.818 PREOP EXAMINATION: ICD-10-CM

## 2021-02-02 DIAGNOSIS — H40.1493 PSEUDOEXFOLIATION (PXF) GLAUCOMA, SEVERE STAGE: ICD-10-CM

## 2021-02-02 DIAGNOSIS — U07.1 PNEUMONIA DUE TO COVID-19 VIRUS: ICD-10-CM

## 2021-02-02 DIAGNOSIS — H25.813 COMBINED FORMS OF AGE-RELATED CATARACT, BILATERAL: Primary | ICD-10-CM

## 2021-02-02 DIAGNOSIS — J12.82 PNEUMONIA DUE TO COVID-19 VIRUS: ICD-10-CM

## 2021-02-02 DIAGNOSIS — Z12.5 ENCOUNTER FOR SCREENING FOR MALIGNANT NEOPLASM OF PROSTATE: ICD-10-CM

## 2021-02-02 DIAGNOSIS — Z87.442 HISTORY OF KIDNEY STONES: ICD-10-CM

## 2021-02-02 PROBLEM — M62.830 MUSCLE SPASM OF BACK: Status: RESOLVED | Noted: 2017-12-12 | Resolved: 2021-02-02

## 2021-02-02 PROBLEM — H04.123 INSUFFICIENCY OF TEAR FILM OF BOTH EYES: Status: ACTIVE | Noted: 2019-10-16

## 2021-02-02 PROBLEM — N02.9: Status: RESOLVED | Noted: 2017-09-18 | Resolved: 2021-02-02

## 2021-02-02 PROBLEM — J01.00 ACUTE NON-RECURRENT MAXILLARY SINUSITIS: Status: RESOLVED | Noted: 2018-05-08 | Resolved: 2021-02-02

## 2021-02-02 PROBLEM — H40.1490 PSEUDOEXFOLIATION (PXF) GLAUCOMA: Status: ACTIVE | Noted: 2017-10-19

## 2021-02-02 LAB
ALBUMIN SERPL-MCNC: 3.5 G/DL (ref 3.4–5)
ALBUMIN/GLOB SERPL: 1 {RATIO} (ref 1–2)
ALP LIVER SERPL-CCNC: 88 U/L
ALT SERPL-CCNC: 30 U/L
ANION GAP SERPL CALC-SCNC: 5 MMOL/L (ref 0–18)
AST SERPL-CCNC: 27 U/L (ref 15–37)
BASOPHILS # BLD AUTO: 0.04 X10(3) UL (ref 0–0.2)
BASOPHILS NFR BLD AUTO: 0.5 %
BILIRUB SERPL-MCNC: 0.4 MG/DL (ref 0.1–2)
BUN BLD-MCNC: 19 MG/DL (ref 7–18)
BUN/CREAT SERPL: 16.4 (ref 10–20)
CALCIUM BLD-MCNC: 9.2 MG/DL (ref 8.5–10.1)
CHLORIDE SERPL-SCNC: 109 MMOL/L (ref 98–112)
CHOLEST SMN-MCNC: 222 MG/DL (ref ?–200)
CO2 SERPL-SCNC: 21 MMOL/L (ref 21–32)
COMPLEXED PSA SERPL-MCNC: 1.58 NG/ML (ref ?–4)
CREAT BLD-MCNC: 1.16 MG/DL
CREAT UR-SCNC: 74.8 MG/DL
DEPRECATED RDW RBC AUTO: 44 FL (ref 35.1–46.3)
EOSINOPHIL # BLD AUTO: 0.27 X10(3) UL (ref 0–0.7)
EOSINOPHIL NFR BLD AUTO: 3.4 %
ERYTHROCYTE [DISTWIDTH] IN BLOOD BY AUTOMATED COUNT: 14.6 % (ref 11–15)
EST. AVERAGE GLUCOSE BLD GHB EST-MCNC: 269 MG/DL (ref 68–126)
GLOBULIN PLAS-MCNC: 3.6 G/DL (ref 2.8–4.4)
GLUCOSE BLD-MCNC: 315 MG/DL (ref 70–99)
HBA1C MFR BLD HPLC: 11 % (ref ?–5.7)
HCT VFR BLD AUTO: 43.7 %
HDLC SERPL-MCNC: 30 MG/DL (ref 40–59)
HGB BLD-MCNC: 14.3 G/DL
IMM GRANULOCYTES # BLD AUTO: 0.03 X10(3) UL (ref 0–1)
IMM GRANULOCYTES NFR BLD: 0.4 %
LDLC SERPL CALC-MCNC: 120 MG/DL (ref ?–100)
LYMPHOCYTES # BLD AUTO: 2.83 X10(3) UL (ref 1–4)
LYMPHOCYTES NFR BLD AUTO: 35.9 %
M PROTEIN MFR SERPL ELPH: 7.1 G/DL (ref 6.4–8.2)
MCH RBC QN AUTO: 27.6 PG (ref 26–34)
MCHC RBC AUTO-ENTMCNC: 32.7 G/DL (ref 31–37)
MCV RBC AUTO: 84.4 FL
MICROALBUMIN UR-MCNC: 6.71 MG/DL
MICROALBUMIN/CREAT 24H UR-RTO: 89.7 UG/MG (ref ?–30)
MONOCYTES # BLD AUTO: 0.51 X10(3) UL (ref 0.1–1)
MONOCYTES NFR BLD AUTO: 6.5 %
NEUTROPHILS # BLD AUTO: 4.21 X10 (3) UL (ref 1.5–7.7)
NEUTROPHILS # BLD AUTO: 4.21 X10(3) UL (ref 1.5–7.7)
NEUTROPHILS NFR BLD AUTO: 53.3 %
NONHDLC SERPL-MCNC: 192 MG/DL (ref ?–130)
OSMOLALITY SERPL CALC.SUM OF ELEC: 294 MOSM/KG (ref 275–295)
PATIENT FASTING Y/N/NP: YES
PATIENT FASTING Y/N/NP: YES
PLATELET # BLD AUTO: 262 10(3)UL (ref 150–450)
POTASSIUM SERPL-SCNC: 4.2 MMOL/L (ref 3.5–5.1)
RBC # BLD AUTO: 5.18 X10(6)UL
SODIUM SERPL-SCNC: 135 MMOL/L (ref 136–145)
TRIGL SERPL-MCNC: 362 MG/DL (ref 30–149)
TSI SER-ACNC: 2.51 MIU/ML (ref 0.36–3.74)
URATE SERPL-MCNC: 4.5 MG/DL
VLDLC SERPL CALC-MCNC: 72 MG/DL (ref 0–30)
WBC # BLD AUTO: 7.9 X10(3) UL (ref 4–11)

## 2021-02-02 PROCEDURE — 84443 ASSAY THYROID STIM HORMONE: CPT | Performed by: FAMILY MEDICINE

## 2021-02-02 PROCEDURE — 85025 COMPLETE CBC W/AUTO DIFF WBC: CPT | Performed by: FAMILY MEDICINE

## 2021-02-02 PROCEDURE — 80061 LIPID PANEL: CPT | Performed by: FAMILY MEDICINE

## 2021-02-02 PROCEDURE — 82043 UR ALBUMIN QUANTITATIVE: CPT | Performed by: FAMILY MEDICINE

## 2021-02-02 PROCEDURE — 82570 ASSAY OF URINE CREATININE: CPT | Performed by: FAMILY MEDICINE

## 2021-02-02 PROCEDURE — 99214 OFFICE O/P EST MOD 30 MIN: CPT | Performed by: FAMILY MEDICINE

## 2021-02-02 PROCEDURE — 84550 ASSAY OF BLOOD/URIC ACID: CPT | Performed by: FAMILY MEDICINE

## 2021-02-02 PROCEDURE — 80053 COMPREHEN METABOLIC PANEL: CPT | Performed by: FAMILY MEDICINE

## 2021-02-02 PROCEDURE — 83036 HEMOGLOBIN GLYCOSYLATED A1C: CPT | Performed by: FAMILY MEDICINE

## 2021-02-02 RX ORDER — BROMFENAC SODIUM 0.7 MG/ML
1 SOLUTION/ DROPS OPHTHALMIC AS DIRECTED
COMMUNITY
Start: 2021-01-25 | End: 2021-05-04

## 2021-02-02 RX ORDER — MOXIFLOXACIN 5 MG/ML
1 SOLUTION/ DROPS OPHTHALMIC AS DIRECTED
COMMUNITY
Start: 2021-01-25 | End: 2021-05-04

## 2021-02-02 RX ORDER — LOTEPREDNOL ETABONATE 3.8 MG/G
1 GEL OPHTHALMIC AS DIRECTED
COMMUNITY
Start: 2021-01-25 | End: 2021-05-04

## 2021-02-02 NOTE — PROGRESS NOTES
[facility] [place]  PROGRESS NOTE  Chief Complaint:   Patient presents with:  Pre-Op: Cataract/ Nataliia      HPI:   This is a 66year old male coming in for preoperative examination prior to cataract surgery.   He has bilateral age-related ca Negative    Ketones, UA Neg Negative mg/dL    Spec Gravity 1.015 1.005 - 1.030    Blood Urine Mod Negative    PH Urine 5.5 4.5 - 8.0    Protein Urine 30 Negative/Trace mg/dL    Urobilinogen Urine 0.2 0.0 - 1.9 mg/dL    Nitrite Urine Pos Negative    Leukocy capsules by mouth 2 (two) times daily. • PROLENSA 0.07 % Ophthalmic Solution Apply 1 drop to eye As Directed. • Moxifloxacin HCl 0.5 % Ophthalmic Solution Apply 1 drop to eye As Directed.      • latanoprost 0.005 % Ophthalmic Solution INT 1 GTT IN O BMI 31.49 kg/m²  Estimated body mass index is 31.49 kg/m² as calculated from the following:    Height as of this encounter: 5' 11\" (1.803 m). Weight as of this encounter: 225 lb 12.8 oz (102.4 kg). Vital signs reviewed.   Appears stated age, well constance sinus rhythm with right bundle branch block. No EKG was done today. - CBC WITH DIFFERENTIAL WITH PLATELET; Future  - COMP METABOLIC PANEL (14); Future  - HEMOGLOBIN A1C; Future  - LIPID PANEL; Future  - MICROALB/CREAT RATIO, RANDOM URINE;  Future  - PSA S that has resolved. 11. Encounter for screening for malignant neoplasm of prostate   He is due for lab work today. Will check a PSA level.   - PSA SCREEN; Future      Meds & Refills for this Visit:  Requested Prescriptions      No prescriptions requested

## 2021-02-02 NOTE — PATIENT INSTRUCTIONS
Check labs today. ECG was done at [facility] on November 27, 2020 and showed normal sinus rhythm with right bundle branch block. He has had no cardiac changes since that time so ECG was not repeated today.   Surgical clearance depends on the resu

## 2021-02-03 ENCOUNTER — TELEPHONE (OUTPATIENT)
Dept: FAMILY MEDICINE CLINIC | Facility: CLINIC | Age: 71
End: 2021-02-03

## 2021-02-03 DIAGNOSIS — E11.9 CONTROLLED TYPE 2 DIABETES MELLITUS WITHOUT COMPLICATION, WITHOUT LONG-TERM CURRENT USE OF INSULIN (HCC): Primary | ICD-10-CM

## 2021-02-03 NOTE — TELEPHONE ENCOUNTER
Needing fasting lab orders entered for May. Copy of Labs/'s instructions mailed to patient.   Harshad Sarmiento, 02/03/21, 1:29 PM

## 2021-02-03 NOTE — TELEPHONE ENCOUNTER
----- Message from Danette Shannon MD sent at 2/3/2021  1:09 PM CST -----  Please call [facility] and fax results to preop nursing. The urine test shows that the diabetes is starting to cause some changes in the kidneys.   The microalbumin to creatinine ratio

## 2021-02-03 NOTE — TELEPHONE ENCOUNTER
Please call [facility] and fax results to preop nursing. The urine test shows that the diabetes is starting to cause some changes in the kidneys. The microalbumin to creatinine ratio is 89.7. Normal is less than 30.   This does indicate that there may be probl

## 2021-02-03 NOTE — TELEPHONE ENCOUNTER
Patient informed of below. Expressed understanding. Future appts given. Katina Flores, 02/03/21, 1:26 PM      Future appt:     Your appointments     Date & Time Appointment Department [facility])    May 03, 2021  9:00 AM CDT Laboratory Visit with Segundo Reyna

## 2021-02-05 ENCOUNTER — TELEPHONE (OUTPATIENT)
Dept: FAMILY MEDICINE CLINIC | Facility: CLINIC | Age: 71
End: 2021-02-05

## 2021-02-06 DIAGNOSIS — Z23 NEED FOR VACCINATION: ICD-10-CM

## 2021-02-17 ENCOUNTER — PATIENT OUTREACH (OUTPATIENT)
Dept: FAMILY MEDICINE CLINIC | Facility: CLINIC | Age: 71
End: 2021-02-17

## 2021-02-17 ENCOUNTER — TELEPHONE (OUTPATIENT)
Dept: FAMILY MEDICINE CLINIC | Facility: CLINIC | Age: 71
End: 2021-02-17

## 2021-02-17 NOTE — TELEPHONE ENCOUNTER
Called patient with [employer] review. Changed patient appointment in May to [facility], patient aware. Has fasting lab appointment scheduled the day before.     Has A1C and CMP ordered already, had routine wellness labs done 02/2021

## 2021-02-17 NOTE — PROGRESS NOTES
Chart reviewed for care gaps. Patient has been seen in office since last care Report completed. Has pending information as well as upcoming appointment.

## 2021-05-03 ENCOUNTER — LAB ENCOUNTER (OUTPATIENT)
Dept: LAB | Age: 71
End: 2021-05-03
Attending: FAMILY MEDICINE
Payer: MEDICARE

## 2021-05-03 DIAGNOSIS — E11.9 CONTROLLED TYPE 2 DIABETES MELLITUS WITHOUT COMPLICATION, WITHOUT LONG-TERM CURRENT USE OF INSULIN (HCC): ICD-10-CM

## 2021-05-03 PROCEDURE — 36415 COLL VENOUS BLD VENIPUNCTURE: CPT

## 2021-05-03 PROCEDURE — 83036 HEMOGLOBIN GLYCOSYLATED A1C: CPT

## 2021-05-03 PROCEDURE — 80053 COMPREHEN METABOLIC PANEL: CPT

## 2021-05-04 ENCOUNTER — OFFICE VISIT (OUTPATIENT)
Dept: FAMILY MEDICINE CLINIC | Facility: CLINIC | Age: 71
End: 2021-05-04
Payer: MEDICARE

## 2021-05-04 ENCOUNTER — TELEPHONE (OUTPATIENT)
Dept: FAMILY MEDICINE CLINIC | Facility: CLINIC | Age: 71
End: 2021-05-04

## 2021-05-04 VITALS
WEIGHT: 222 LBS | HEIGHT: 71 IN | SYSTOLIC BLOOD PRESSURE: 110 MMHG | HEART RATE: 80 BPM | RESPIRATION RATE: 16 BRPM | TEMPERATURE: 98 F | BODY MASS INDEX: 31.08 KG/M2 | OXYGEN SATURATION: 100 % | DIASTOLIC BLOOD PRESSURE: 64 MMHG

## 2021-05-04 DIAGNOSIS — M54.50 CHRONIC MIDLINE LOW BACK PAIN WITHOUT SCIATICA: ICD-10-CM

## 2021-05-04 DIAGNOSIS — H40.1493 PSEUDOEXFOLIATION (PXF) GLAUCOMA, SEVERE STAGE: ICD-10-CM

## 2021-05-04 DIAGNOSIS — Z99.89 OSA ON CPAP: ICD-10-CM

## 2021-05-04 DIAGNOSIS — E66.09 CLASS 1 OBESITY DUE TO EXCESS CALORIES WITH SERIOUS COMORBIDITY AND BODY MASS INDEX (BMI) OF 30.0 TO 30.9 IN ADULT: ICD-10-CM

## 2021-05-04 DIAGNOSIS — Z87.442 HISTORY OF KIDNEY STONES: ICD-10-CM

## 2021-05-04 DIAGNOSIS — H04.123 INSUFFICIENCY OF TEAR FILM OF BOTH EYES: ICD-10-CM

## 2021-05-04 DIAGNOSIS — I45.10 RIGHT BUNDLE BRANCH BLOCK: ICD-10-CM

## 2021-05-04 DIAGNOSIS — Z00.00 ENCOUNTER FOR ANNUAL HEALTH EXAMINATION: Primary | ICD-10-CM

## 2021-05-04 DIAGNOSIS — F52.8 PSYCHOSEXUAL DYSFUNCTION WITH INHIBITED SEXUAL EXCITEMENT: ICD-10-CM

## 2021-05-04 DIAGNOSIS — Z86.16 HISTORY OF COVID-19: ICD-10-CM

## 2021-05-04 DIAGNOSIS — G47.33 OSA ON CPAP: ICD-10-CM

## 2021-05-04 DIAGNOSIS — G89.29 CHRONIC MIDLINE LOW BACK PAIN WITHOUT SCIATICA: ICD-10-CM

## 2021-05-04 DIAGNOSIS — I10 HTN (HYPERTENSION), BENIGN: ICD-10-CM

## 2021-05-04 DIAGNOSIS — E11.9 CONTROLLED TYPE 2 DIABETES MELLITUS WITHOUT COMPLICATION, WITHOUT LONG-TERM CURRENT USE OF INSULIN (HCC): ICD-10-CM

## 2021-05-04 DIAGNOSIS — L23.89 ALLERGIC CONTACT DERMATITIS DUE TO OTHER AGENTS: ICD-10-CM

## 2021-05-04 PROBLEM — H25.813 COMBINED FORMS OF AGE-RELATED CATARACT, BILATERAL: Status: RESOLVED | Noted: 2017-10-19 | Resolved: 2021-05-04

## 2021-05-04 PROBLEM — M79.2 NEURALGIA: Status: RESOLVED | Noted: 2018-09-25 | Resolved: 2021-05-04

## 2021-05-04 PROBLEM — Z01.818 PREOP EXAMINATION: Status: RESOLVED | Noted: 2021-02-02 | Resolved: 2021-05-04

## 2021-05-04 PROCEDURE — 99214 OFFICE O/P EST MOD 30 MIN: CPT | Performed by: FAMILY MEDICINE

## 2021-05-04 PROCEDURE — G0439 PPPS, SUBSEQ VISIT: HCPCS | Performed by: FAMILY MEDICINE

## 2021-05-04 RX ORDER — DULAGLUTIDE 1.5 MG/.5ML
1.5 INJECTION, SOLUTION SUBCUTANEOUS WEEKLY
Qty: 4 PEN | Refills: 3 | Status: SHIPPED | OUTPATIENT
Start: 2021-05-04 | End: 2021-05-04

## 2021-05-04 RX ORDER — DULAGLUTIDE 1.5 MG/.5ML
1.5 INJECTION, SOLUTION SUBCUTANEOUS WEEKLY
Qty: 4 PEN | Refills: 3 | Status: CANCELLED | OUTPATIENT
Start: 2021-05-04

## 2021-05-04 RX ORDER — DULAGLUTIDE 1.5 MG/.5ML
1.5 INJECTION, SOLUTION SUBCUTANEOUS WEEKLY
Qty: 4 PEN | Refills: 3 | Status: SHIPPED | OUTPATIENT
Start: 2021-05-04 | End: 2021-09-20

## 2021-05-04 NOTE — PROGRESS NOTES
REASON FOR VISIT:    Kannan Garrett is a 66year old male who presents for a Medicare Annual Wellness visit. He is here for his annual wellness visit. He had his cataract surgery done. His vision is now very good.   They put stents in his eyes as w (two) times daily with meals. 60 tablet 5   • FIBER COMPLETE OR Take 1 Dose by mouth daily. • Misc Natural Products (SAW PALMETTO COMPLEX EX ST OR) Take 1 capsule by mouth daily. • topiramate (TOPAMAX) 200 MG Oral Tab Take 1 tablet by mouth daily. help  Eating: Able without help  Driving: Able without help  Preparing your meals: Able without help  Managing money/bills: Able without help  Taking medications as prescribed: Able without help  Are you able to afford your medications?: Yes  Hearing Probl Creat/alb ratio  Annually Creatinine (mg/dL)   Date Value   05/03/2021 1.19       LDL  Annually LDL Cholesterol (mg/dL)   Date Value   02/02/2021 120 (H)       Dilated Eye exam  Annually Data entered on: 10/15/2020   Last Dilated Eye Exam 10/14/2020 no suspicious lesions  HEENT: atraumatic, normocephalic, ears and throat are clear                Hearing Assessed via: Finger Rub  EYES: PERRLA, EOMI, conjunctiva are clear    CHEST: no chest tenderness  LUNGS: clear to auscultation  CARDIO: RRR without m obesity due to excess calories with serious comorbidity and body mass index (BMI) of 30.0 to 30.9 in adult  Unchanged. He is interested in a small amount of weight loss in hopes of the Trulicity will help him with that.   - OFFICE/OUTPT VISIT,EST,LEVL IV shingles vaccine is due

## 2021-05-04 NOTE — TELEPHONE ENCOUNTER
I called and spoke with No Quintana. He does not have Medicare part D and so the cost will be very high for injectable medicine. He understands that and said that he would like to try the Trulicity. He suggested that we send it into the [employer].  I have

## 2021-05-04 NOTE — PATIENT INSTRUCTIONS
Start taking Trulicity 1.5 mg injection weekly. Recommended Websites for Advanced Directives    SeekAl[employer].no. org/publications/Documents/personal_dec. pdf  An information packet, including necessary form from the [facility]

## 2021-05-04 NOTE — TELEPHONE ENCOUNTER
Patient does not have Medicare Part D. Trulicty/Victoza/Ozempic--All close to $1000.00  Even with Rx Card. Please advise.   Rell Smith, 05/04/21, 3:31 PM

## 2021-07-19 ENCOUNTER — TELEPHONE (OUTPATIENT)
Dept: FAMILY MEDICINE CLINIC | Facility: CLINIC | Age: 71
End: 2021-07-19

## 2021-07-19 DIAGNOSIS — E11.9 CONTROLLED TYPE 2 DIABETES MELLITUS WITHOUT COMPLICATION, WITHOUT LONG-TERM CURRENT USE OF INSULIN (HCC): Primary | ICD-10-CM

## 2021-07-19 NOTE — TELEPHONE ENCOUNTER
Appt for fasting labs given. Sandrinejoan Schmidt, 07/19/21, 3:08 PM    Future appt:     Your appointments     Date & Time Appointment Department [facility])    Jul 30, 2021  9:15 AM CDT Laboratory Visit with REF Stephanie Needle [facility] (EDW Ref Lab [facility])

## 2021-07-27 NOTE — TELEPHONE ENCOUNTER
Metformin: 2/3/21       Return in 3 months (on 8/4/2021). Future appt:     Your appointments     Date & Time Appointment Department [facility])    Jul 30, 2021  9:15 AM CDT Laboratory Visit with REF Perkins Bound Reference Lab (EDW Ref Lab Martín Moreau)

## 2021-07-30 ENCOUNTER — LAB ENCOUNTER (OUTPATIENT)
Dept: LAB | Age: 71
End: 2021-07-30
Attending: FAMILY MEDICINE
Payer: MEDICARE

## 2021-07-30 DIAGNOSIS — E11.9 CONTROLLED TYPE 2 DIABETES MELLITUS WITHOUT COMPLICATION, WITHOUT LONG-TERM CURRENT USE OF INSULIN (HCC): ICD-10-CM

## 2021-07-30 LAB
ALBUMIN SERPL-MCNC: 3.9 G/DL (ref 3.4–5)
ALBUMIN/GLOB SERPL: 1.1 {RATIO} (ref 1–2)
ALP LIVER SERPL-CCNC: 73 U/L
ALT SERPL-CCNC: 39 U/L
ANION GAP SERPL CALC-SCNC: 1 MMOL/L (ref 0–18)
AST SERPL-CCNC: 49 U/L (ref 15–37)
BILIRUB SERPL-MCNC: 0.4 MG/DL (ref 0.1–2)
BUN BLD-MCNC: 16 MG/DL (ref 7–18)
CALCIUM BLD-MCNC: 9 MG/DL (ref 8.5–10.1)
CHLORIDE SERPL-SCNC: 114 MMOL/L (ref 98–112)
CO2 SERPL-SCNC: 25 MMOL/L (ref 21–32)
CREAT BLD-MCNC: 1.2 MG/DL
EST. AVERAGE GLUCOSE BLD GHB EST-MCNC: 148 MG/DL (ref 68–126)
GLOBULIN PLAS-MCNC: 3.4 G/DL (ref 2.8–4.4)
GLUCOSE BLD-MCNC: 161 MG/DL (ref 70–99)
HBA1C MFR BLD HPLC: 6.8 % (ref ?–5.7)
M PROTEIN MFR SERPL ELPH: 7.3 G/DL (ref 6.4–8.2)
OSMOLALITY SERPL CALC.SUM OF ELEC: 295 MOSM/KG (ref 275–295)
PATIENT FASTING Y/N/NP: YES
POTASSIUM SERPL-SCNC: 4.5 MMOL/L (ref 3.5–5.1)
SODIUM SERPL-SCNC: 140 MMOL/L (ref 136–145)

## 2021-07-30 PROCEDURE — 36415 COLL VENOUS BLD VENIPUNCTURE: CPT

## 2021-07-30 PROCEDURE — 80053 COMPREHEN METABOLIC PANEL: CPT

## 2021-07-30 PROCEDURE — 83036 HEMOGLOBIN GLYCOSYLATED A1C: CPT

## 2021-08-03 ENCOUNTER — OFFICE VISIT (OUTPATIENT)
Dept: FAMILY MEDICINE CLINIC | Facility: CLINIC | Age: 71
End: 2021-08-03
Payer: MEDICARE

## 2021-08-03 VITALS
RESPIRATION RATE: 16 BRPM | OXYGEN SATURATION: 96 % | TEMPERATURE: 97 F | HEIGHT: 71 IN | BODY MASS INDEX: 31.22 KG/M2 | HEART RATE: 79 BPM | WEIGHT: 223 LBS | DIASTOLIC BLOOD PRESSURE: 80 MMHG | SYSTOLIC BLOOD PRESSURE: 118 MMHG

## 2021-08-03 DIAGNOSIS — E11.9 CONTROLLED TYPE 2 DIABETES MELLITUS WITHOUT COMPLICATION, WITHOUT LONG-TERM CURRENT USE OF INSULIN (HCC): Primary | ICD-10-CM

## 2021-08-03 DIAGNOSIS — I10 HTN (HYPERTENSION), BENIGN: ICD-10-CM

## 2021-08-03 PROCEDURE — 99213 OFFICE O/P EST LOW 20 MIN: CPT | Performed by: FAMILY MEDICINE

## 2021-08-03 NOTE — PATIENT INSTRUCTIONS
Trulicity has been effective but it is expensive. Please finish the current supply, then start Januvia (Sitagliptan) 25 mg daily. Recheck labs in 4 months.

## 2021-08-03 NOTE — PROGRESS NOTES
[address]  PROGRESS NOTE  Chief Complaint:   Patient presents with: Follow - Up: 3 months      HPI:   This is a 66year old male coming in for follow-up on his medication.   He reports that Trulicity has dramatically changed his appetit Laterality Date   • APPENDECTOMY     • CATARACT     • COLONOSCOPY     • HERNIA SURGERY     • OTHER SURGICAL HISTORY      Kidney     Social History:  Social History    Tobacco Use      Smoking status: Never Smoker      Smokeless tobacco: Never Used    Alcoh swelling or stiffness. NEUROLOGICAL:  Denies headache, seizures, dizziness, syncope, paralysis, ataxia, numbness or tingling in the extremities,change in bowel or bladder control. HEMATOLOGIC:  Denies anemia, bleeding or bruising.   LYMPHATICS:  Denies en normal.  Bilateral monofilament/sensation of both feet is diminished; he felt the touch when he was not being touched. Pulsation pedal pulse exam of both lower legs/feet is normal as well. ASSESSMENT AND PLAN:   1.  Controlled type 2 diabetes mellitus Psychosexual dysfunction with inhibited sexual excitement     Obesity     Right bundle branch block     RADHA on CPAP     HTN (hypertension), benign     Pseudoexfoliation (PXF) glaucoma     Insufficiency of tear film of both eyes     History of COVID-19

## 2021-08-04 ENCOUNTER — TELEPHONE (OUTPATIENT)
Dept: FAMILY MEDICINE CLINIC | Facility: CLINIC | Age: 71
End: 2021-08-04

## 2021-08-04 ENCOUNTER — OFFICE VISIT (OUTPATIENT)
Dept: FAMILY MEDICINE CLINIC | Facility: CLINIC | Age: 71
End: 2021-08-04
Payer: MEDICARE

## 2021-08-04 VITALS
HEIGHT: 71 IN | WEIGHT: 226.63 LBS | OXYGEN SATURATION: 97 % | DIASTOLIC BLOOD PRESSURE: 80 MMHG | RESPIRATION RATE: 16 BRPM | HEART RATE: 74 BPM | TEMPERATURE: 98 F | SYSTOLIC BLOOD PRESSURE: 124 MMHG | BODY MASS INDEX: 31.73 KG/M2

## 2021-08-04 DIAGNOSIS — R31.9 HEMATURIA, UNSPECIFIED TYPE: Primary | ICD-10-CM

## 2021-08-04 DIAGNOSIS — R31.0 GROSS HEMATURIA: ICD-10-CM

## 2021-08-04 DIAGNOSIS — Z87.442 HISTORY OF KIDNEY STONES: ICD-10-CM

## 2021-08-04 LAB
APPEARANCE: CLEAR
BILIRUB UR QL STRIP.AUTO: NEGATIVE
BILIRUBIN: NEGATIVE
GLUCOSE (URINE DIPSTICK): NEGATIVE MG/DL
GLUCOSE UR STRIP.AUTO-MCNC: NEGATIVE MG/DL
KETONES (URINE DIPSTICK): NEGATIVE MG/DL
KETONES UR STRIP.AUTO-MCNC: NEGATIVE MG/DL
MULTISTIX LOT#: 5077 NUMERIC
NITRITE UR QL STRIP.AUTO: NEGATIVE
NITRITE, URINE: NEGATIVE
PH UR STRIP.AUTO: 6 [PH] (ref 5–8)
PH, URINE: 6 (ref 4.5–8)
PROT UR STRIP.AUTO-MCNC: 100 MG/DL
PROTEIN (URINE DIPSTICK): 100 MG/DL
RBC #/AREA URNS AUTO: >10 /HPF
SP GR UR STRIP.AUTO: 1.01 (ref 1–1.03)
SPECIFIC GRAVITY: 1.01 (ref 1–1.03)
UROBILINOGEN UR STRIP.AUTO-MCNC: <2 MG/DL
UROBILINOGEN,SEMI-QN: 0.2 MG/DL (ref 0–1.9)

## 2021-08-04 PROCEDURE — 81003 URINALYSIS AUTO W/O SCOPE: CPT | Performed by: FAMILY MEDICINE

## 2021-08-04 PROCEDURE — 99213 OFFICE O/P EST LOW 20 MIN: CPT | Performed by: FAMILY MEDICINE

## 2021-08-04 PROCEDURE — 87086 URINE CULTURE/COLONY COUNT: CPT | Performed by: FAMILY MEDICINE

## 2021-08-04 PROCEDURE — 81001 URINALYSIS AUTO W/SCOPE: CPT | Performed by: FAMILY MEDICINE

## 2021-08-04 RX ORDER — CIPROFLOXACIN 250 MG/1
250 TABLET, FILM COATED ORAL 2 TIMES DAILY
Qty: 20 TABLET | Refills: 0 | Status: SHIPPED | OUTPATIENT
Start: 2021-08-04 | End: 2021-12-07 | Stop reason: ALTCHOICE

## 2021-08-04 NOTE — TELEPHONE ENCOUNTER
please call            Future Appointments   Date Time Provider Rowena Murphy   12/4/2021 10:00 AM REF [place] [facility]   12/7/2021 10:00 AM Farhan Fatima MD [facility] [place] [facility]

## 2021-08-04 NOTE — TELEPHONE ENCOUNTER
Patient states he urinated this morning and had blood. Previous kidney stone dx. Appt given today with  for evaluation of   Blood in urine.   Clari Patrick, 08/04/21, 10:43 AM

## 2021-08-04 NOTE — PROGRESS NOTES
[facility] [place]  PROGRESS NOTE  Chief Complaint:   Patient presents with:  Hematuria      HPI:   This is a 66year old male coming in for blood in his urine. He said that he was fine yesterday.   He did a little bit of heavy lifting yesterday Outpatient Medications   Medication Sig Dispense Refill   • ciprofloxacin (CIPRO) 250 MG Oral Tab Take 1 tablet (250 mg total) by mouth 2 (two) times daily. 20 tablet 0   • SITagliptin Phosphate 25 MG Oral Tab Take 1 tablet (25 mg total) by mouth daily.  27 Pulse 74   Temp 97.6 °F (36.4 °C) (Temporal)   Resp 16   Ht 5' 11\" (1.803 m)   Wt 226 lb 9.6 oz (102.8 kg)   SpO2 97%   BMI 31.60 kg/m²  Estimated body mass index is 31.6 kg/m² as calculated from the following:    Height as of this encounter: 5' 11\" (1. stones  He stones in the past.  He denies any kidney stone pain now.   - URINALYSIS, ROUTINE; Future  - URINE CULTURE, ROUTINE; Future  - URINALYSIS, ROUTINE  - URINE CULTURE, ROUTINE      Meds & Refills for this Visit:  Requested Prescriptions     Signed P

## 2021-08-06 ENCOUNTER — TELEPHONE (OUTPATIENT)
Dept: FAMILY MEDICINE CLINIC | Facility: CLINIC | Age: 71
End: 2021-08-06

## 2021-08-06 DIAGNOSIS — Z87.442 HISTORY OF KIDNEY STONES: ICD-10-CM

## 2021-08-06 DIAGNOSIS — R31.0 GROSS HEMATURIA: Primary | ICD-10-CM

## 2021-08-06 NOTE — TELEPHONE ENCOUNTER
----- Message from Neil Gill MD sent at 8/6/2021  3:05 PM CDT -----  Urine culture shows multiple types of bacteria. It is probably contaminated. Continue the Cipro as previously ordered.

## 2021-08-06 NOTE — TELEPHONE ENCOUNTER
Due to an error in our office, the urinalysis and urine culture were sent now. The urinalysis does show 21-50 white cells and large amount of blood. This is consistent with a urinary tract infection. Plan: Please take the full 10 days worth of Cipro.   I

## 2021-09-20 RX ORDER — DULAGLUTIDE 1.5 MG/.5ML
INJECTION, SOLUTION SUBCUTANEOUS
Qty: 6 ML | Refills: 1 | Status: SHIPPED | OUTPATIENT
Start: 2021-09-20 | End: 2021-12-17

## 2021-09-20 NOTE — TELEPHONE ENCOUNTER
Future appt:     Your appointments     Date & Time Appointment Department [facility])    Dec 04, 2021 10:00 AM CST Laboratory Visit with REF Toñito Hough Reference Lab (EDW Ref Lab Judie Zimmer)        Dec 07, 2021 10:00 AM CST Follow up - Extended with [facility]
Known

## 2021-12-04 ENCOUNTER — LAB ENCOUNTER (OUTPATIENT)
Dept: LAB | Age: 71
End: 2021-12-04
Attending: FAMILY MEDICINE
Payer: MEDICARE

## 2021-12-04 ENCOUNTER — TELEPHONE (OUTPATIENT)
Dept: FAMILY MEDICINE CLINIC | Facility: CLINIC | Age: 71
End: 2021-12-04

## 2021-12-04 DIAGNOSIS — Z20.822 CLOSE EXPOSURE TO COVID-19 VIRUS: Primary | ICD-10-CM

## 2021-12-04 DIAGNOSIS — E11.9 CONTROLLED TYPE 2 DIABETES MELLITUS WITHOUT COMPLICATION, WITHOUT LONG-TERM CURRENT USE OF INSULIN (HCC): ICD-10-CM

## 2021-12-04 PROCEDURE — 80053 COMPREHEN METABOLIC PANEL: CPT

## 2021-12-04 PROCEDURE — 36415 COLL VENOUS BLD VENIPUNCTURE: CPT

## 2021-12-04 PROCEDURE — 83036 HEMOGLOBIN GLYCOSYLATED A1C: CPT

## 2021-12-07 ENCOUNTER — OFFICE VISIT (OUTPATIENT)
Dept: FAMILY MEDICINE CLINIC | Facility: CLINIC | Age: 71
End: 2021-12-07
Payer: MEDICARE

## 2021-12-07 ENCOUNTER — TELEPHONE (OUTPATIENT)
Dept: FAMILY MEDICINE CLINIC | Facility: CLINIC | Age: 71
End: 2021-12-07

## 2021-12-07 VITALS
RESPIRATION RATE: 16 BRPM | BODY MASS INDEX: 31.08 KG/M2 | WEIGHT: 222 LBS | SYSTOLIC BLOOD PRESSURE: 108 MMHG | TEMPERATURE: 96 F | HEART RATE: 80 BPM | HEIGHT: 71 IN | DIASTOLIC BLOOD PRESSURE: 68 MMHG

## 2021-12-07 DIAGNOSIS — E66.09 CLASS 1 OBESITY DUE TO EXCESS CALORIES WITH SERIOUS COMORBIDITY AND BODY MASS INDEX (BMI) OF 30.0 TO 30.9 IN ADULT: ICD-10-CM

## 2021-12-07 DIAGNOSIS — I10 HTN (HYPERTENSION), BENIGN: ICD-10-CM

## 2021-12-07 DIAGNOSIS — Z12.5 ENCOUNTER FOR SCREENING FOR MALIGNANT NEOPLASM OF PROSTATE: ICD-10-CM

## 2021-12-07 DIAGNOSIS — E11.9 CONTROLLED TYPE 2 DIABETES MELLITUS WITHOUT COMPLICATION, WITHOUT LONG-TERM CURRENT USE OF INSULIN (HCC): Primary | ICD-10-CM

## 2021-12-07 PROCEDURE — 99214 OFFICE O/P EST MOD 30 MIN: CPT | Performed by: FAMILY MEDICINE

## 2021-12-07 NOTE — PROGRESS NOTES
[facility] [place]  PROGRESS NOTE  Chief Complaint:   Patient presents with:  Diabetes  Follow - Up      HPI:   This is a 66year old male coming in for diabetes follow-up. He reports that he does not always follow the diet closely.   Last night Misc Natural Products (SAW PALMETTO COMPLEX EX ST OR) Take 1 capsule by mouth daily. • topiramate 200 MG Oral Tab Take 1 tablet by mouth daily. • DULoxetine 60 MG Oral Cap DR Particles Take 2 capsules by mouth 2 (two) times daily.         Counseling oriented, well developed, well nourished, no apparent distress.   HEENT:  Head:  Normocephalic, atraumatic Eyes: EOMI, PERRLA, no scleral icterus, conjunctivae clear bilaterally, no eye discharge Ears: External normal. Nose: patent, no nasal discharge Throa weight loss. - CBC WITH DIFFERENTIAL WITH PLATELET; Future  - COMP METABOLIC PANEL (14); Future  - HEMOGLOBIN A1C; Future  - LIPID PANEL; Future  - MICROALB/CREAT RATIO, RANDOM URINE;  Future  - PSA SCREEN; Future  - ASSAY, THYROID STIM HORMONE; Future COVID-19     History of kidney stones     Gross hematuria      Otis Cruz MD  12/7/2021  10:59 AM

## 2021-12-16 NOTE — TELEPHONE ENCOUNTER
Future appt: Your appointments     Date & Time Appointment Department [facility])    May 05, 2022 11:00 AM CDT Laboratory Visit with REF Marilee Nicolas Reference Lab (EDW Ref Lab [place])        May 10, 2022 10:30 AM CDT Medicare Annual Well Visit with Ahsan Garcia MD 25 [facility] ([facility])            [address] [place] [id number]  [address] [place] [address] Reference Lab  EDW Ref Lab [facility]         Last Appointment with provider:   12/7/2021 for diabetes follow up. Last appointment at [facility]:  12/7/2021  Cholesterol, Total (mg/dL)   Date Value   02/02/2021 222 (H)     HDL Cholesterol (mg/dL)   Date Value   02/02/2021 30 (L)     LDL Cholesterol (mg/dL)   Date Value   02/02/2021 120 (H)     Triglycerides (mg/dL)   Date Value   02/02/2021 362 (H)     Lab Results   Component Value Date     (H) 12/04/2021    A1C 7.1 (H) 12/04/2021     Lab Results   Component Value Date    TSH 2.510 02/02/2021       No follow-ups on file.

## 2021-12-17 RX ORDER — DULAGLUTIDE 1.5 MG/.5ML
INJECTION, SOLUTION SUBCUTANEOUS
Qty: 6 ML | Refills: 1 | Status: SHIPPED | OUTPATIENT
Start: 2021-12-17 | End: 2022-02-28

## 2022-01-05 ENCOUNTER — VIRTUAL PHONE E/M (OUTPATIENT)
Dept: FAMILY MEDICINE CLINIC | Facility: CLINIC | Age: 72
End: 2022-01-05
Payer: MEDICARE

## 2022-01-05 DIAGNOSIS — G47.33 OSA ON CPAP: Primary | ICD-10-CM

## 2022-01-05 DIAGNOSIS — Z99.89 OSA ON CPAP: Primary | ICD-10-CM

## 2022-01-05 PROCEDURE — 99441 PHONE E/M BY PHYS 5-10 MIN: CPT | Performed by: NURSE PRACTITIONER

## 2022-01-05 NOTE — PROGRESS NOTES
[facility]  SLE[facility]        HPI:   This is a 66year old male coming in for Patient presents with:  Obstructive Sleep Apnea (RADHA): sleep follow up       HPI:     Phone visit done with patient for a follow-up on his sleep thera Alcohol use: No    Drug use: No    Family History:  History reviewed. No pertinent family history.   Allergies:    Pravastatin                 Comment:Other reaction(s): Diffuse muscle aches  Current Meds:  Current Outpatient Medications   Medication Sig Kayy Meléndez Encounters:  12/07/21 : 222 lb (100.7 kg)  08/04/21 : 226 lb 9.6 oz (102.8 kg)  08/03/21 : 223 lb (101.2 kg)  05/04/21 : 222 lb (100.7 kg)  02/02/21 : 225 lb 12.8 oz (102.4 kg)  04/28/20 : 230 lb (104.3 kg)    BP Readings from Last 3 Encounters:  12/07/21 nights of the week. Recommend weight loss, and maintain and optimal BMI with Exercise 30 minutes most days of the week to target heart rate . Advised patient to change filters,masks,hoses  and tubes and equiptment on a  regular schedule.   Filters and

## 2022-02-28 RX ORDER — DULAGLUTIDE 1.5 MG/.5ML
1.5 INJECTION, SOLUTION SUBCUTANEOUS WEEKLY
Qty: 6 ML | Refills: 1 | Status: SHIPPED | OUTPATIENT
Start: 2022-02-28

## 2022-02-28 NOTE — TELEPHONE ENCOUNTER
Trulicity to [place] in [place]. Had a couple of pens malfunctioning, so need a Rx in order to have those replaced.

## 2022-02-28 NOTE — TELEPHONE ENCOUNTER
Future appt: Your appointments     Date & Time Appointment Department [facility])    May 05, 2022 11:00 AM CDT Laboratory Visit with REF Delcie Gottron Reference Lab (EDW Ref Lab [place])        May 10, 2022 10:30 AM CDT Medicare Annual Well Visit with Cloyd Schlatter, MD 25 [facility] ([facility])            25 [facility]  [phone]  [address] [facility] Reference Lab  EDW Ref Lab [place]  [address] [id number]  [phone]        Last Appointment with provider:   12/7/2021  Last appointment at [facility] [place]:  12/7/2021  Cholesterol, Total (mg/dL)   Date Value   02/02/2021 222 (H)     HDL Cholesterol (mg/dL)   Date Value   02/02/2021 30 (L)     LDL Cholesterol (mg/dL)   Date Value   02/02/2021 120 (H)     Triglycerides (mg/dL)   Date Value   02/02/2021 362 (H)     Lab Results   Component Value Date     (H) 12/04/2021    A1C 7.1 (H) 12/04/2021     Lab Results   Component Value Date    TSH 2.510 02/02/2021       No follow-ups on file.

## 2022-05-03 ENCOUNTER — TELEPHONE (OUTPATIENT)
Dept: FAMILY MEDICINE CLINIC | Facility: CLINIC | Age: 72
End: 2022-05-03

## 2022-05-03 NOTE — TELEPHONE ENCOUNTER
Patient requesting Cologuard vs Colonoscopy. Please advise order.   Jason Blake CMA, 05/03/22, 11:46 AM

## 2022-05-05 ENCOUNTER — LABORATORY ENCOUNTER (OUTPATIENT)
Dept: LAB | Age: 72
End: 2022-05-05
Attending: FAMILY MEDICINE
Payer: MEDICARE

## 2022-05-05 DIAGNOSIS — E11.9 CONTROLLED TYPE 2 DIABETES MELLITUS WITHOUT COMPLICATION, WITHOUT LONG-TERM CURRENT USE OF INSULIN (HCC): ICD-10-CM

## 2022-05-05 DIAGNOSIS — E66.09 CLASS 1 OBESITY DUE TO EXCESS CALORIES WITH SERIOUS COMORBIDITY AND BODY MASS INDEX (BMI) OF 30.0 TO 30.9 IN ADULT: ICD-10-CM

## 2022-05-05 DIAGNOSIS — I10 HTN (HYPERTENSION), BENIGN: ICD-10-CM

## 2022-05-05 DIAGNOSIS — Z12.5 ENCOUNTER FOR SCREENING FOR MALIGNANT NEOPLASM OF PROSTATE: ICD-10-CM

## 2022-05-05 DIAGNOSIS — R31.0 GROSS HEMATURIA: ICD-10-CM

## 2022-05-05 DIAGNOSIS — Z87.442 HISTORY OF KIDNEY STONES: ICD-10-CM

## 2022-05-05 LAB
ALBUMIN SERPL-MCNC: 3.8 G/DL (ref 3.4–5)
ALBUMIN/GLOB SERPL: 1.1 {RATIO} (ref 1–2)
ALP LIVER SERPL-CCNC: 79 U/L
ALT SERPL-CCNC: 27 U/L
ANION GAP SERPL CALC-SCNC: 4 MMOL/L (ref 0–18)
AST SERPL-CCNC: 14 U/L (ref 15–37)
BASOPHILS # BLD AUTO: 0.06 X10(3) UL (ref 0–0.2)
BASOPHILS NFR BLD AUTO: 0.6 %
BILIRUB SERPL-MCNC: 0.4 MG/DL (ref 0.1–2)
BUN BLD-MCNC: 22 MG/DL (ref 7–18)
CALCIUM BLD-MCNC: 9.3 MG/DL (ref 8.5–10.1)
CHLORIDE SERPL-SCNC: 112 MMOL/L (ref 98–112)
CHOLEST SERPL-MCNC: 231 MG/DL (ref ?–200)
CO2 SERPL-SCNC: 26 MMOL/L (ref 21–32)
COMPLEXED PSA SERPL-MCNC: 2.45 NG/ML (ref ?–4)
CREAT BLD-MCNC: 1.11 MG/DL
CREAT UR-SCNC: 74.5 MG/DL
EOSINOPHIL # BLD AUTO: 0.34 X10(3) UL (ref 0–0.7)
EOSINOPHIL NFR BLD AUTO: 3.6 %
ERYTHROCYTE [DISTWIDTH] IN BLOOD BY AUTOMATED COUNT: 14.6 %
EST. AVERAGE GLUCOSE BLD GHB EST-MCNC: 154 MG/DL (ref 68–126)
FASTING PATIENT LIPID ANSWER: YES
FASTING STATUS PATIENT QL REPORTED: YES
GLOBULIN PLAS-MCNC: 3.4 G/DL (ref 2.8–4.4)
GLUCOSE BLD-MCNC: 132 MG/DL (ref 70–99)
HBA1C MFR BLD: 7 % (ref ?–5.7)
HCT VFR BLD AUTO: 45.2 %
HDLC SERPL-MCNC: 35 MG/DL (ref 40–59)
HGB BLD-MCNC: 14 G/DL
IMM GRANULOCYTES # BLD AUTO: 0.03 X10(3) UL (ref 0–1)
IMM GRANULOCYTES NFR BLD: 0.3 %
LDLC SERPL CALC-MCNC: 162 MG/DL (ref ?–100)
LYMPHOCYTES # BLD AUTO: 2.86 X10(3) UL (ref 1–4)
LYMPHOCYTES NFR BLD AUTO: 30.7 %
MCH RBC QN AUTO: 26.6 PG (ref 26–34)
MCHC RBC AUTO-ENTMCNC: 31 G/DL (ref 31–37)
MCV RBC AUTO: 85.9 FL
MICROALBUMIN UR-MCNC: 3.74 MG/DL
MICROALBUMIN/CREAT 24H UR-RTO: 50.2 UG/MG (ref ?–30)
MONOCYTES # BLD AUTO: 0.65 X10(3) UL (ref 0.1–1)
MONOCYTES NFR BLD AUTO: 7 %
NEUTROPHILS # BLD AUTO: 5.39 X10 (3) UL (ref 1.5–7.7)
NEUTROPHILS # BLD AUTO: 5.39 X10(3) UL (ref 1.5–7.7)
NEUTROPHILS NFR BLD AUTO: 57.8 %
NONHDLC SERPL-MCNC: 196 MG/DL (ref ?–130)
OSMOLALITY SERPL CALC.SUM OF ELEC: 299 MOSM/KG (ref 275–295)
PLATELET # BLD AUTO: 257 10(3)UL (ref 150–450)
POTASSIUM SERPL-SCNC: 4.9 MMOL/L (ref 3.5–5.1)
PROT SERPL-MCNC: 7.2 G/DL (ref 6.4–8.2)
RBC # BLD AUTO: 5.26 X10(6)UL
SODIUM SERPL-SCNC: 142 MMOL/L (ref 136–145)
TRIGL SERPL-MCNC: 181 MG/DL (ref 30–149)
TSI SER-ACNC: 2.78 MIU/ML (ref 0.36–3.74)
URATE SERPL-MCNC: 6 MG/DL
VLDLC SERPL CALC-MCNC: 36 MG/DL (ref 0–30)
WBC # BLD AUTO: 9.3 X10(3) UL (ref 4–11)

## 2022-05-05 PROCEDURE — 87186 SC STD MICRODIL/AGAR DIL: CPT

## 2022-05-05 PROCEDURE — 80053 COMPREHEN METABOLIC PANEL: CPT

## 2022-05-05 PROCEDURE — 82570 ASSAY OF URINE CREATININE: CPT

## 2022-05-05 PROCEDURE — 83036 HEMOGLOBIN GLYCOSYLATED A1C: CPT

## 2022-05-05 PROCEDURE — 87086 URINE CULTURE/COLONY COUNT: CPT

## 2022-05-05 PROCEDURE — 85025 COMPLETE CBC W/AUTO DIFF WBC: CPT

## 2022-05-05 PROCEDURE — 82043 UR ALBUMIN QUANTITATIVE: CPT

## 2022-05-05 PROCEDURE — 36415 COLL VENOUS BLD VENIPUNCTURE: CPT

## 2022-05-05 PROCEDURE — 80061 LIPID PANEL: CPT

## 2022-05-05 PROCEDURE — 87077 CULTURE AEROBIC IDENTIFY: CPT

## 2022-05-05 PROCEDURE — 84443 ASSAY THYROID STIM HORMONE: CPT

## 2022-05-05 PROCEDURE — 84550 ASSAY OF BLOOD/URIC ACID: CPT

## 2022-05-06 ENCOUNTER — TELEPHONE (OUTPATIENT)
Dept: FAMILY MEDICINE CLINIC | Facility: CLINIC | Age: 72
End: 2022-05-06

## 2022-05-06 NOTE — TELEPHONE ENCOUNTER
----- Message from Alisson Capps MD sent at 5/6/2022 10:32 AM CDT -----  Labs reviewed. Will discuss at office visit.

## 2022-05-09 ENCOUNTER — TELEPHONE (OUTPATIENT)
Dept: FAMILY MEDICINE CLINIC | Facility: CLINIC | Age: 72
End: 2022-05-09

## 2022-05-09 RX ORDER — NITROFURANTOIN MACROCRYSTALS 100 MG/1
100 CAPSULE ORAL 4 TIMES DAILY
Qty: 20 CAPSULE | Refills: 0 | Status: SHIPPED | OUTPATIENT
Start: 2022-05-09 | End: 2022-05-09

## 2022-05-09 RX ORDER — NITROFURANTOIN MACROCRYSTALS 100 MG/1
100 CAPSULE ORAL 2 TIMES DAILY
Qty: 20 CAPSULE | Refills: 0 | Status: SHIPPED | OUTPATIENT
Start: 2022-05-09

## 2022-05-09 NOTE — TELEPHONE ENCOUNTER
The urine culture grew out staph aureus. This indicates that there is a urinary tract infection. Plan: Start Macrodantin 100 mg twice a day for 10 days.

## 2022-05-09 NOTE — TELEPHONE ENCOUNTER
Spoke to pt, advised of results. Pt understands and is agreeable.     ----- Message from Oneida Yates MD sent at 5/9/2022 10:20 AM CDT -----  The urine culture grew out staph aureus. This indicates that there is a urinary tract infection. Plan: Start Macrodantin 100 mg twice a day for 10 days.

## 2022-05-10 ENCOUNTER — OFFICE VISIT (OUTPATIENT)
Dept: FAMILY MEDICINE CLINIC | Facility: CLINIC | Age: 72
End: 2022-05-10
Payer: MEDICARE

## 2022-05-10 VITALS
DIASTOLIC BLOOD PRESSURE: 62 MMHG | SYSTOLIC BLOOD PRESSURE: 112 MMHG | BODY MASS INDEX: 30.52 KG/M2 | WEIGHT: 218 LBS | HEIGHT: 71 IN | RESPIRATION RATE: 16 BRPM | TEMPERATURE: 97 F | HEART RATE: 77 BPM | OXYGEN SATURATION: 96 %

## 2022-05-10 DIAGNOSIS — Z00.00 ENCOUNTER FOR ANNUAL HEALTH EXAMINATION: Primary | ICD-10-CM

## 2022-05-10 DIAGNOSIS — Z87.442 HISTORY OF KIDNEY STONES: ICD-10-CM

## 2022-05-10 DIAGNOSIS — N52.01 ERECTILE DYSFUNCTION DUE TO ARTERIAL INSUFFICIENCY: ICD-10-CM

## 2022-05-10 DIAGNOSIS — H04.123 INSUFFICIENCY OF TEAR FILM OF BOTH EYES: ICD-10-CM

## 2022-05-10 DIAGNOSIS — M54.50 CHRONIC MIDLINE LOW BACK PAIN WITHOUT SCIATICA: ICD-10-CM

## 2022-05-10 DIAGNOSIS — Z99.89 OSA ON CPAP: ICD-10-CM

## 2022-05-10 DIAGNOSIS — E66.09 CLASS 1 OBESITY DUE TO EXCESS CALORIES WITH SERIOUS COMORBIDITY AND BODY MASS INDEX (BMI) OF 30.0 TO 30.9 IN ADULT: ICD-10-CM

## 2022-05-10 DIAGNOSIS — Z86.16 HISTORY OF COVID-19: ICD-10-CM

## 2022-05-10 DIAGNOSIS — I45.10 RIGHT BUNDLE BRANCH BLOCK: ICD-10-CM

## 2022-05-10 DIAGNOSIS — I10 HTN (HYPERTENSION), BENIGN: ICD-10-CM

## 2022-05-10 DIAGNOSIS — G89.29 CHRONIC MIDLINE LOW BACK PAIN WITHOUT SCIATICA: ICD-10-CM

## 2022-05-10 DIAGNOSIS — E11.9 CONTROLLED TYPE 2 DIABETES MELLITUS WITHOUT COMPLICATION, WITHOUT LONG-TERM CURRENT USE OF INSULIN (HCC): ICD-10-CM

## 2022-05-10 DIAGNOSIS — G47.33 OSA ON CPAP: ICD-10-CM

## 2022-05-10 PROBLEM — H40.1490 PSEUDOEXFOLIATION (PXF) GLAUCOMA: Status: RESOLVED | Noted: 2017-10-19 | Resolved: 2022-05-10

## 2022-05-10 PROBLEM — R31.0 GROSS HEMATURIA: Status: RESOLVED | Noted: 2021-08-04 | Resolved: 2022-05-10

## 2022-05-10 PROCEDURE — 99213 OFFICE O/P EST LOW 20 MIN: CPT | Performed by: FAMILY MEDICINE

## 2022-05-10 PROCEDURE — G0439 PPPS, SUBSEQ VISIT: HCPCS | Performed by: FAMILY MEDICINE

## 2022-05-10 RX ORDER — TOPIRAMATE 100 MG/1
100 TABLET, FILM COATED ORAL DAILY
COMMUNITY
Start: 2022-02-24

## 2022-07-15 ENCOUNTER — TELEPHONE (OUTPATIENT)
Dept: FAMILY MEDICINE CLINIC | Facility: CLINIC | Age: 72
End: 2022-07-15

## 2022-07-15 DIAGNOSIS — E11.9 CONTROLLED TYPE 2 DIABETES MELLITUS WITHOUT COMPLICATION, WITHOUT LONG-TERM CURRENT USE OF INSULIN (HCC): Primary | ICD-10-CM

## 2022-07-15 NOTE — TELEPHONE ENCOUNTER
Last appt 5/10/22 advised Return in 6 months (on 11/10/2022). Future appt: Your appointments     Date & Time Appointment Department [facility])    Nov 22, 2022 10:00 AM CST Laboratory Visit with REF Meera Manna Reference Lab (EDW Ref Lab [place])        Nov 29, 2022 10:30 AM CST Follow up - Extended with Ashley Mccoy MD 25 [facility] [place] ([facility])            [address] [place] [address]  [address] [facility] [phone]  [address] [facility] Reference Lab  EDW Ref Lab [place]  [address] [id number]  [phone]        Last Appointment with provider:   5/10/2022  Last appointment at [facility] [place]:  5/10/2022  Cholesterol, Total (mg/dL)   Date Value   05/05/2022 231 (H)     HDL Cholesterol (mg/dL)   Date Value   05/05/2022 35 (L)     LDL Cholesterol (mg/dL)   Date Value   05/05/2022 162 (H)     Triglycerides (mg/dL)   Date Value   05/05/2022 181 (H)     Lab Results   Component Value Date     (H) 05/05/2022    A1C 7.0 (H) 05/05/2022     Lab Results   Component Value Date    TSH 2.780 05/05/2022       No follow-ups on file.

## 2022-07-25 ENCOUNTER — HOSPITAL ENCOUNTER (OUTPATIENT)
Dept: GENERAL RADIOLOGY | Age: 72
Discharge: HOME | End: 2022-07-25
Attending: NURSE PRACTITIONER
Payer: MEDICARE

## 2022-07-25 ENCOUNTER — TELEPHONE (OUTPATIENT)
Dept: FAMILY MEDICINE CLINIC | Facility: CLINIC | Age: 72
End: 2022-07-25

## 2022-07-25 ENCOUNTER — OFFICE VISIT (OUTPATIENT)
Dept: FAMILY MEDICINE CLINIC | Facility: CLINIC | Age: 72
End: 2022-07-25
Payer: MEDICARE

## 2022-07-25 VITALS
OXYGEN SATURATION: 99 % | SYSTOLIC BLOOD PRESSURE: 108 MMHG | DIASTOLIC BLOOD PRESSURE: 62 MMHG | HEART RATE: 80 BPM | RESPIRATION RATE: 18 BRPM | TEMPERATURE: 98 F

## 2022-07-25 DIAGNOSIS — M25.512 ACUTE PAIN OF LEFT SHOULDER: Primary | ICD-10-CM

## 2022-07-25 DIAGNOSIS — M25.512 ACUTE PAIN OF LEFT SHOULDER: ICD-10-CM

## 2022-07-25 PROCEDURE — 99214 OFFICE O/P EST MOD 30 MIN: CPT | Performed by: NURSE PRACTITIONER

## 2022-07-25 PROCEDURE — 73030 X-RAY EXAM OF SHOULDER: CPT | Performed by: NURSE PRACTITIONER

## 2022-07-25 RX ORDER — ACETAMINOPHEN AND CODEINE PHOSPHATE 300; 30 MG/1; MG/1
1 TABLET ORAL EVERY 4 HOURS PRN
Qty: 30 TABLET | Refills: 0 | Status: SHIPPED | OUTPATIENT
Start: 2022-07-25

## 2022-07-25 NOTE — TELEPHONE ENCOUNTER
----- Message from YAHIR Caldera sent at 7/25/2022 11:17 AM CDT -----  Radiologist sees a bony fragment for a possible fracture - age undetermined. Recommend to follow up with ortho as discussed at appointment. Note to MyChart.

## 2022-07-25 NOTE — PATIENT INSTRUCTIONS
Sling applied    Limit ibuprofen to 2400 mg in a day. Take the Tylenol #3 every 6 hours as needed for pain - do NOT drive on the medication. Referral done to ortho - appointment scheduled for Thursday at 1 PM. X-rays have been forwarded to them. Go to ER if symptoms worsen. Otherwise follow up as needed.

## 2022-11-22 ENCOUNTER — LABORATORY ENCOUNTER (OUTPATIENT)
Dept: LAB | Age: 72
End: 2022-11-22
Attending: FAMILY MEDICINE
Payer: MEDICARE

## 2022-11-22 DIAGNOSIS — E11.9 CONTROLLED TYPE 2 DIABETES MELLITUS WITHOUT COMPLICATION, WITHOUT LONG-TERM CURRENT USE OF INSULIN (HCC): ICD-10-CM

## 2022-11-22 DIAGNOSIS — I10 HTN (HYPERTENSION), BENIGN: ICD-10-CM

## 2022-11-22 LAB
ALBUMIN SERPL-MCNC: 3.8 G/DL (ref 3.4–5)
ALBUMIN/GLOB SERPL: 1.1 {RATIO} (ref 1–2)
ALP LIVER SERPL-CCNC: 79 U/L
ALT SERPL-CCNC: 31 U/L
ANION GAP SERPL CALC-SCNC: 7 MMOL/L (ref 0–18)
AST SERPL-CCNC: 14 U/L (ref 15–37)
BILIRUB SERPL-MCNC: 0.4 MG/DL (ref 0.1–2)
BUN BLD-MCNC: 21 MG/DL (ref 7–18)
CALCIUM BLD-MCNC: 9.6 MG/DL (ref 8.5–10.1)
CHLORIDE SERPL-SCNC: 112 MMOL/L (ref 98–112)
CO2 SERPL-SCNC: 23 MMOL/L (ref 21–32)
CREAT BLD-MCNC: 1.19 MG/DL
EST. AVERAGE GLUCOSE BLD GHB EST-MCNC: 163 MG/DL (ref 68–126)
FASTING STATUS PATIENT QL REPORTED: YES
GFR SERPLBLD BASED ON 1.73 SQ M-ARVRAT: 64 ML/MIN/1.73M2 (ref 60–?)
GLOBULIN PLAS-MCNC: 3.5 G/DL (ref 2.8–4.4)
GLUCOSE BLD-MCNC: 180 MG/DL (ref 70–99)
HBA1C MFR BLD: 7.3 % (ref ?–5.7)
OSMOLALITY SERPL CALC.SUM OF ELEC: 302 MOSM/KG (ref 275–295)
POTASSIUM SERPL-SCNC: 5 MMOL/L (ref 3.5–5.1)
PROT SERPL-MCNC: 7.3 G/DL (ref 6.4–8.2)
SODIUM SERPL-SCNC: 142 MMOL/L (ref 136–145)

## 2022-11-22 PROCEDURE — 83036 HEMOGLOBIN GLYCOSYLATED A1C: CPT

## 2022-11-22 PROCEDURE — 86803 HEPATITIS C AB TEST: CPT

## 2022-11-22 PROCEDURE — 80053 COMPREHEN METABOLIC PANEL: CPT

## 2022-11-22 PROCEDURE — 36415 COLL VENOUS BLD VENIPUNCTURE: CPT

## 2022-11-23 LAB — HCV AB SERPL QL IA: NONREACTIVE

## 2022-11-29 ENCOUNTER — OFFICE VISIT (OUTPATIENT)
Dept: FAMILY MEDICINE CLINIC | Facility: CLINIC | Age: 72
End: 2022-11-29
Payer: MEDICARE

## 2022-11-29 VITALS
HEART RATE: 76 BPM | TEMPERATURE: 97 F | OXYGEN SATURATION: 97 % | WEIGHT: 222 LBS | RESPIRATION RATE: 18 BRPM | HEIGHT: 71 IN | SYSTOLIC BLOOD PRESSURE: 110 MMHG | BODY MASS INDEX: 31.08 KG/M2 | DIASTOLIC BLOOD PRESSURE: 68 MMHG

## 2022-11-29 DIAGNOSIS — E66.09 CLASS 1 OBESITY DUE TO EXCESS CALORIES WITH SERIOUS COMORBIDITY AND BODY MASS INDEX (BMI) OF 30.0 TO 30.9 IN ADULT: ICD-10-CM

## 2022-11-29 DIAGNOSIS — I10 HTN (HYPERTENSION), BENIGN: ICD-10-CM

## 2022-11-29 DIAGNOSIS — M54.50 CHRONIC MIDLINE LOW BACK PAIN WITHOUT SCIATICA: ICD-10-CM

## 2022-11-29 DIAGNOSIS — Z87.442 HISTORY OF KIDNEY STONES: ICD-10-CM

## 2022-11-29 DIAGNOSIS — G89.29 CHRONIC MIDLINE LOW BACK PAIN WITHOUT SCIATICA: ICD-10-CM

## 2022-11-29 DIAGNOSIS — Z12.5 ENCOUNTER FOR SCREENING FOR MALIGNANT NEOPLASM OF PROSTATE: ICD-10-CM

## 2022-11-29 DIAGNOSIS — G47.33 OSA ON CPAP: ICD-10-CM

## 2022-11-29 DIAGNOSIS — E11.40 TYPE 2 DIABETES MELLITUS WITH DIABETIC NEUROPATHY, WITHOUT LONG-TERM CURRENT USE OF INSULIN (HCC): Primary | ICD-10-CM

## 2022-11-29 DIAGNOSIS — Z99.89 OSA ON CPAP: ICD-10-CM

## 2022-11-29 PROCEDURE — 1126F AMNT PAIN NOTED NONE PRSNT: CPT | Performed by: FAMILY MEDICINE

## 2022-11-29 PROCEDURE — 99214 OFFICE O/P EST MOD 30 MIN: CPT | Performed by: FAMILY MEDICINE

## 2022-12-27 DIAGNOSIS — E11.9 CONTROLLED TYPE 2 DIABETES MELLITUS WITHOUT COMPLICATION, WITHOUT LONG-TERM CURRENT USE OF INSULIN (HCC): ICD-10-CM

## 2022-12-27 NOTE — TELEPHONE ENCOUNTER
Metformin: 7/15/22    Future appt: Your appointments     Date & Time Appointment Department [facility])    May 31, 2023 10:30 AM CDT Follow Up Visit with Krysten Thompson MD 25 [facility], Reginaldo Paez ([facility])            25 [facility] [place]  [facility] [phone]  [phone]        Last Appointment with provider:   11/29/2022  Last appointment at [facility] [place]:  11/29/2022  Cholesterol, Total (mg/dL)   Date Value   05/05/2022 231 (H)     HDL Cholesterol (mg/dL)   Date Value   05/05/2022 35 (L)     LDL Cholesterol (mg/dL)   Date Value   05/05/2022 162 (H)     Triglycerides (mg/dL)   Date Value   05/05/2022 181 (H)     Lab Results   Component Value Date     (H) 11/22/2022    A1C 7.3 (H) 11/22/2022     Lab Results   Component Value Date    TSH 2.780 05/05/2022       No follow-ups on file.

## 2023-01-23 ENCOUNTER — OFFICE VISIT (OUTPATIENT)
Dept: FAMILY MEDICINE CLINIC | Facility: CLINIC | Age: 73
End: 2023-01-23
Payer: MEDICARE

## 2023-01-23 VITALS
HEART RATE: 69 BPM | HEIGHT: 71 IN | WEIGHT: 225 LBS | DIASTOLIC BLOOD PRESSURE: 68 MMHG | SYSTOLIC BLOOD PRESSURE: 110 MMHG | OXYGEN SATURATION: 98 % | BODY MASS INDEX: 31.5 KG/M2 | RESPIRATION RATE: 18 BRPM | TEMPERATURE: 96 F

## 2023-01-23 DIAGNOSIS — Z99.89 OSA ON CPAP: Primary | ICD-10-CM

## 2023-01-23 DIAGNOSIS — G47.33 OSA ON CPAP: Primary | ICD-10-CM

## 2023-01-23 PROCEDURE — 99214 OFFICE O/P EST MOD 30 MIN: CPT | Performed by: NURSE PRACTITIONER

## 2023-02-28 RX ORDER — DULAGLUTIDE 1.5 MG/.5ML
INJECTION, SOLUTION SUBCUTANEOUS
Qty: 6 ML | Refills: 1 | Status: SHIPPED | OUTPATIENT
Start: 2023-02-28

## 2023-02-28 NOTE — TELEPHONE ENCOUNTER
Trulicity: [birth year]    Future appt: Your appointments     Date & Time Appointment Department [facility])    May 31, 2023 10:30 AM CDT Medicare Annual Well Visit with Kathren Severe, MD [address], [place]akan Dey ([facility])        Jul 24, 2023 11:00 AM CDT Sleep Follow Up with [address] [place] [address], Naga Robson, APRN [address], [place] ([facility])            [address], [facility] [place]  [address] [phone]  [phone]        Last Appointment with provider:   11/29/2022  Last appointment at [facility] [place]:  1/23/2023  Cholesterol, Total (mg/dL)   Date Value   05/05/2022 231 (H)     HDL Cholesterol (mg/dL)   Date Value   05/05/2022 35 (L)     LDL Cholesterol (mg/dL)   Date Value   05/05/2022 162 (H)     Triglycerides (mg/dL)   Date Value   05/05/2022 181 (H)     Lab Results   Component Value Date     (H) 11/22/2022    A1C 7.3 (H) 11/22/2022     Lab Results   Component Value Date    TSH 2.780 05/05/2022       No follow-ups on file.

## 2023-05-31 ENCOUNTER — TELEPHONE (OUTPATIENT)
Dept: FAMILY MEDICINE CLINIC | Facility: CLINIC | Age: 73
End: 2023-05-31

## 2023-05-31 NOTE — TELEPHONE ENCOUNTER
Patient wants to reschedule appointment he missed today. Wants to schedule labs before that appointment.  Needs lab orders

## 2023-05-31 NOTE — TELEPHONE ENCOUNTER
Patient no showed for an appointment with Dr. Ty Betnacur. Called patient and he said he will call back to reschedule. Informed patient of the 40.00 no show fee.

## 2023-06-23 DIAGNOSIS — E11.9 CONTROLLED TYPE 2 DIABETES MELLITUS WITHOUT COMPLICATION, WITHOUT LONG-TERM CURRENT USE OF INSULIN (HCC): ICD-10-CM

## 2023-06-23 NOTE — TELEPHONE ENCOUNTER
Future appt: Your appointments     Date & Time Appointment Department [facility])    Jul 24, 2023 11:00 AM CDT Sleep Follow Up with [address], Jenean Cogan, [address] ([facility])        Aug 10, 2023 10:00 AM CDT Laboratory Visit with REF Yuko Villagomez Byrd Jensen (EDW Ref Lab Kamran Petersen)        Aug 14, 2023  2:00 PM CDT Physical - Established with Ladonna Charles MD [address] [place], [place] [address] [facility])            Yuko Lao Byrd Jensen  EDW Ref Lab [place]  [phone] [id number]  [address] [facility]  [address] [phone]  [phone]        Last Appointment with provider:   5/31/2023  Last appointment at [facility] [place]:  5/31/2023  Cholesterol, Total (mg/dL)   Date Value   05/05/2022 231 (H)     HDL Cholesterol (mg/dL)   Date Value   05/05/2022 35 (L)     LDL Cholesterol (mg/dL)   Date Value   05/05/2022 162 (H)     Triglycerides (mg/dL)   Date Value   05/05/2022 181 (H)     Lab Results   Component Value Date     (H) 11/22/2022    A1C 7.3 (H) 11/22/2022     Lab Results   Component Value Date    TSH 2.780 05/05/2022     Last RF:  12/27/2022    No follow-ups on file.

## 2023-07-24 ENCOUNTER — OFFICE VISIT (OUTPATIENT)
Dept: FAMILY MEDICINE CLINIC | Facility: CLINIC | Age: 73
End: 2023-07-24
Payer: MEDICARE

## 2023-07-24 VITALS
SYSTOLIC BLOOD PRESSURE: 118 MMHG | HEART RATE: 71 BPM | OXYGEN SATURATION: 97 % | TEMPERATURE: 98 F | WEIGHT: 220.81 LBS | DIASTOLIC BLOOD PRESSURE: 68 MMHG | HEIGHT: 71 IN | BODY MASS INDEX: 30.91 KG/M2 | RESPIRATION RATE: 18 BRPM

## 2023-07-24 DIAGNOSIS — G47.33 OSA ON CPAP: Primary | ICD-10-CM

## 2023-07-24 DIAGNOSIS — E04.9 ENLARGED THYROID: ICD-10-CM

## 2023-07-24 PROCEDURE — 99214 OFFICE O/P EST MOD 30 MIN: CPT | Performed by: NURSE PRACTITIONER

## 2023-07-24 PROCEDURE — 1126F AMNT PAIN NOTED NONE PRSNT: CPT | Performed by: NURSE PRACTITIONER

## 2023-07-24 RX ORDER — TOPIRAMATE 50 MG/1
50 TABLET, FILM COATED ORAL DAILY
COMMUNITY
Start: 2023-05-23

## 2023-07-24 NOTE — PATIENT INSTRUCTIONS
Schedule appointment for fasting labs. If TSH abnormal, recommend thyroid ultrasound   Keep follow up appointment with Dr. Angelique García. Continue sleep therapy. Follow-up in 6 months - sooner if needed. Advised if still with sleep apnea and not using CPAP has a  7 fold increase in risk of heart attack, stroke, abnormal heart rhythm  and death,  increased risk of driving accidents. Advised to refrain from driving when sleepy. COMPLIANCE is required by insurance for 4 hours a night most nights of the week. Recommend weight loss, and maintain and optimal BMI with Exercise 30 minutes most days of the week to target heart rate . Advised patient to change filters,masks,hoses  and tubes and equiptment on a  regular schedule. Filters and seals shall be changed every 1 month,  Hoses every 3 months,   CPAP mask and humidifier  chamber changed every 6 month  with the Durable medical equipment provider.

## 2023-08-10 ENCOUNTER — LABORATORY ENCOUNTER (OUTPATIENT)
Dept: LAB | Age: 73
End: 2023-08-10
Attending: FAMILY MEDICINE
Payer: MEDICARE

## 2023-08-10 DIAGNOSIS — E66.09 CLASS 1 OBESITY DUE TO EXCESS CALORIES WITH SERIOUS COMORBIDITY AND BODY MASS INDEX (BMI) OF 30.0 TO 30.9 IN ADULT: ICD-10-CM

## 2023-08-10 DIAGNOSIS — E11.40 TYPE 2 DIABETES MELLITUS WITH DIABETIC NEUROPATHY, WITHOUT LONG-TERM CURRENT USE OF INSULIN (HCC): ICD-10-CM

## 2023-08-10 DIAGNOSIS — I10 HTN (HYPERTENSION), BENIGN: ICD-10-CM

## 2023-08-10 DIAGNOSIS — Z12.5 ENCOUNTER FOR SCREENING FOR MALIGNANT NEOPLASM OF PROSTATE: ICD-10-CM

## 2023-08-10 DIAGNOSIS — Z87.442 HISTORY OF KIDNEY STONES: ICD-10-CM

## 2023-08-10 LAB
ALBUMIN SERPL-MCNC: 3.7 G/DL (ref 3.4–5)
ALBUMIN/GLOB SERPL: 1 {RATIO} (ref 1–2)
ALP LIVER SERPL-CCNC: 79 U/L
ALT SERPL-CCNC: 26 U/L
ANION GAP SERPL CALC-SCNC: 2 MMOL/L (ref 0–18)
AST SERPL-CCNC: 15 U/L (ref 15–37)
BASOPHILS # BLD AUTO: 0.05 X10(3) UL (ref 0–0.2)
BASOPHILS NFR BLD AUTO: 0.7 %
BILIRUB SERPL-MCNC: 0.4 MG/DL (ref 0.1–2)
BUN BLD-MCNC: 18 MG/DL (ref 7–18)
CALCIUM BLD-MCNC: 9 MG/DL (ref 8.5–10.1)
CHLORIDE SERPL-SCNC: 114 MMOL/L (ref 98–112)
CHOLEST SERPL-MCNC: 221 MG/DL (ref ?–200)
CO2 SERPL-SCNC: 25 MMOL/L (ref 21–32)
COMPLEXED PSA SERPL-MCNC: 2.72 NG/ML (ref ?–4)
CREAT BLD-MCNC: 1.22 MG/DL
CREAT UR-SCNC: 86 MG/DL
EGFRCR SERPLBLD CKD-EPI 2021: 62 ML/MIN/1.73M2 (ref 60–?)
EOSINOPHIL # BLD AUTO: 0.31 X10(3) UL (ref 0–0.7)
EOSINOPHIL NFR BLD AUTO: 4.1 %
ERYTHROCYTE [DISTWIDTH] IN BLOOD BY AUTOMATED COUNT: 14.2 %
EST. AVERAGE GLUCOSE BLD GHB EST-MCNC: 166 MG/DL (ref 68–126)
FASTING PATIENT LIPID ANSWER: YES
FASTING STATUS PATIENT QL REPORTED: YES
GLOBULIN PLAS-MCNC: 3.6 G/DL (ref 2.8–4.4)
GLUCOSE BLD-MCNC: 149 MG/DL (ref 70–99)
HBA1C MFR BLD: 7.4 % (ref ?–5.7)
HCT VFR BLD AUTO: 43.9 %
HDLC SERPL-MCNC: 37 MG/DL (ref 40–59)
HGB BLD-MCNC: 13.8 G/DL
IMM GRANULOCYTES # BLD AUTO: 0.02 X10(3) UL (ref 0–1)
IMM GRANULOCYTES NFR BLD: 0.3 %
LDLC SERPL CALC-MCNC: 150 MG/DL (ref ?–100)
LYMPHOCYTES # BLD AUTO: 2.18 X10(3) UL (ref 1–4)
LYMPHOCYTES NFR BLD AUTO: 29.1 %
MCH RBC QN AUTO: 27.4 PG (ref 26–34)
MCHC RBC AUTO-ENTMCNC: 31.4 G/DL (ref 31–37)
MCV RBC AUTO: 87.3 FL
MICROALBUMIN UR-MCNC: 0.59 MG/DL
MICROALBUMIN/CREAT 24H UR-RTO: 6.9 UG/MG (ref ?–30)
MONOCYTES # BLD AUTO: 0.52 X10(3) UL (ref 0.1–1)
MONOCYTES NFR BLD AUTO: 7 %
NEUTROPHILS # BLD AUTO: 4.4 X10 (3) UL (ref 1.5–7.7)
NEUTROPHILS # BLD AUTO: 4.4 X10(3) UL (ref 1.5–7.7)
NEUTROPHILS NFR BLD AUTO: 58.8 %
NONHDLC SERPL-MCNC: 184 MG/DL (ref ?–130)
OSMOLALITY SERPL CALC.SUM OF ELEC: 297 MOSM/KG (ref 275–295)
PLATELET # BLD AUTO: 261 10(3)UL (ref 150–450)
POTASSIUM SERPL-SCNC: 5 MMOL/L (ref 3.5–5.1)
PROT SERPL-MCNC: 7.3 G/DL (ref 6.4–8.2)
RBC # BLD AUTO: 5.03 X10(6)UL
SODIUM SERPL-SCNC: 141 MMOL/L (ref 136–145)
TRIGL SERPL-MCNC: 187 MG/DL (ref 30–149)
TSI SER-ACNC: 3.5 MIU/ML (ref 0.36–3.74)
URATE SERPL-MCNC: 5.8 MG/DL
VLDLC SERPL CALC-MCNC: 36 MG/DL (ref 0–30)
WBC # BLD AUTO: 7.5 X10(3) UL (ref 4–11)

## 2023-08-10 PROCEDURE — 36415 COLL VENOUS BLD VENIPUNCTURE: CPT

## 2023-08-10 PROCEDURE — 84550 ASSAY OF BLOOD/URIC ACID: CPT

## 2023-08-10 PROCEDURE — 83036 HEMOGLOBIN GLYCOSYLATED A1C: CPT

## 2023-08-10 PROCEDURE — 84443 ASSAY THYROID STIM HORMONE: CPT

## 2023-08-10 PROCEDURE — 80053 COMPREHEN METABOLIC PANEL: CPT

## 2023-08-10 PROCEDURE — 82570 ASSAY OF URINE CREATININE: CPT

## 2023-08-10 PROCEDURE — 82043 UR ALBUMIN QUANTITATIVE: CPT

## 2023-08-10 PROCEDURE — 80061 LIPID PANEL: CPT

## 2023-08-10 PROCEDURE — 85025 COMPLETE CBC W/AUTO DIFF WBC: CPT

## 2023-08-14 ENCOUNTER — OFFICE VISIT (OUTPATIENT)
Dept: FAMILY MEDICINE CLINIC | Facility: CLINIC | Age: 73
End: 2023-08-14
Payer: MEDICARE

## 2023-08-14 ENCOUNTER — TELEPHONE (OUTPATIENT)
Dept: FAMILY MEDICINE CLINIC | Facility: CLINIC | Age: 73
End: 2023-08-14

## 2023-08-14 VITALS
OXYGEN SATURATION: 100 % | BODY MASS INDEX: 30.8 KG/M2 | DIASTOLIC BLOOD PRESSURE: 62 MMHG | HEIGHT: 71 IN | WEIGHT: 220 LBS | RESPIRATION RATE: 18 BRPM | SYSTOLIC BLOOD PRESSURE: 112 MMHG | HEART RATE: 73 BPM | TEMPERATURE: 98 F

## 2023-08-14 DIAGNOSIS — E11.40 TYPE 2 DIABETES MELLITUS WITH DIABETIC NEUROPATHY, WITHOUT LONG-TERM CURRENT USE OF INSULIN (HCC): ICD-10-CM

## 2023-08-14 DIAGNOSIS — L98.499 CALLOUS ULCER, UNSPECIFIED ULCER STAGE (HCC): ICD-10-CM

## 2023-08-14 DIAGNOSIS — I45.10 RIGHT BUNDLE BRANCH BLOCK: Primary | ICD-10-CM

## 2023-08-14 DIAGNOSIS — E66.09 CLASS 1 OBESITY DUE TO EXCESS CALORIES WITH SERIOUS COMORBIDITY AND BODY MASS INDEX (BMI) OF 30.0 TO 30.9 IN ADULT: ICD-10-CM

## 2023-08-14 DIAGNOSIS — Z00.00 ENCOUNTER FOR ANNUAL HEALTH EXAMINATION: ICD-10-CM

## 2023-08-14 DIAGNOSIS — G47.33 OSA ON CPAP: ICD-10-CM

## 2023-08-14 PROBLEM — I10 HTN (HYPERTENSION), BENIGN: Status: RESOLVED | Noted: 2017-12-12 | Resolved: 2023-08-14

## 2023-08-14 RX ORDER — SEMAGLUTIDE 0.68 MG/ML
0.25 INJECTION, SOLUTION SUBCUTANEOUS WEEKLY
Qty: 3 EACH | Refills: 3 | Status: SHIPPED | OUTPATIENT
Start: 2023-08-14

## 2023-08-14 NOTE — TELEPHONE ENCOUNTER
Was seen today. Scheduled an appointment in 3 months.  Wants to know if he needs labs done before that appointment

## (undated) DIAGNOSIS — Z12.11 SCREENING FOR COLON CANCER: Primary | ICD-10-CM